# Patient Record
Sex: MALE | Race: WHITE | NOT HISPANIC OR LATINO | Employment: FULL TIME | URBAN - METROPOLITAN AREA
[De-identification: names, ages, dates, MRNs, and addresses within clinical notes are randomized per-mention and may not be internally consistent; named-entity substitution may affect disease eponyms.]

---

## 2017-03-27 ENCOUNTER — COMMUNICATION - HEALTHEAST (OUTPATIENT)
Dept: FAMILY MEDICINE | Facility: CLINIC | Age: 37
End: 2017-03-27

## 2017-03-27 DIAGNOSIS — F43.22 ADJUSTMENT DISORDER WITH ANXIOUS MOOD: ICD-10-CM

## 2017-07-01 ENCOUNTER — COMMUNICATION - HEALTHEAST (OUTPATIENT)
Dept: FAMILY MEDICINE | Facility: CLINIC | Age: 37
End: 2017-07-01

## 2017-07-01 DIAGNOSIS — F43.22 ADJUSTMENT DISORDER WITH ANXIOUS MOOD: ICD-10-CM

## 2017-10-10 ENCOUNTER — COMMUNICATION - HEALTHEAST (OUTPATIENT)
Dept: FAMILY MEDICINE | Facility: CLINIC | Age: 37
End: 2017-10-10

## 2017-10-10 DIAGNOSIS — F43.22 ADJUSTMENT DISORDER WITH ANXIOUS MOOD: ICD-10-CM

## 2017-11-01 ENCOUNTER — OFFICE VISIT - HEALTHEAST (OUTPATIENT)
Dept: FAMILY MEDICINE | Facility: CLINIC | Age: 37
End: 2017-11-01

## 2017-11-01 DIAGNOSIS — Z13.29 SCREENING FOR ENDOCRINE DISORDER: ICD-10-CM

## 2017-11-01 DIAGNOSIS — F43.22 ADJUSTMENT DISORDER WITH ANXIOUS MOOD: ICD-10-CM

## 2017-11-01 DIAGNOSIS — Z13.29 SCREENING FOR THYROID DISORDER: ICD-10-CM

## 2017-11-01 DIAGNOSIS — Z13.220 SCREENING, LIPID: ICD-10-CM

## 2017-11-01 DIAGNOSIS — Z00.00 PREVENTATIVE HEALTH CARE: ICD-10-CM

## 2017-11-01 DIAGNOSIS — R06.83 SNORING: ICD-10-CM

## 2017-11-01 DIAGNOSIS — Z13.0 SCREENING, ANEMIA, DEFICIENCY, IRON: ICD-10-CM

## 2017-11-01 DIAGNOSIS — R63.5 WEIGHT GAIN: ICD-10-CM

## 2017-11-01 DIAGNOSIS — E55.9 VITAMIN D DEFICIENCY: ICD-10-CM

## 2017-11-01 DIAGNOSIS — Z13.228 ENCOUNTER FOR SCREENING FOR METABOLIC DISORDER: ICD-10-CM

## 2017-11-01 LAB
CHOLEST SERPL-MCNC: 190 MG/DL
FASTING STATUS PATIENT QL REPORTED: YES
HDLC SERPL-MCNC: 37 MG/DL
LDLC SERPL CALC-MCNC: 135 MG/DL
TRIGL SERPL-MCNC: 92 MG/DL

## 2017-11-01 RX ORDER — BUPROPION HYDROCHLORIDE 150 MG/1
150 TABLET ORAL EVERY MORNING
Qty: 90 TABLET | Refills: 3 | Status: SHIPPED | OUTPATIENT
Start: 2017-11-01 | End: 2022-12-27

## 2017-11-01 ASSESSMENT — MIFFLIN-ST. JEOR: SCORE: 1947.61

## 2017-11-01 NOTE — ASSESSMENT & PLAN NOTE
Zack Ward is a 37 y.o. male says he is doing well with the celexa and wants to continue that.  He has noticed weight gain and feeling tired (he takes the celexa in the mornings).     I mentioned augmenting with Wellbutrin to help with weight gain and energy.  I suggested taking Celexa in the evening and the Wellbutrin in the morning.  He wants to try this, he has no family or personal history of seizures.    Continue Celexa 20 mg orally nightly  Start Wellbutrin 150 mg XR every morning.    Follow up with problems or concerns.

## 2017-11-01 NOTE — ASSESSMENT & PLAN NOTE
He tells me that his wife has noticed him snoring lately.  We did look at his weight graft and he has been gaining weight.  I suggested he attempted weight reduction.    I did ask if he is gasping for breath or if he stops breathing at night and he said his wife assures him that he did not do that.  No sleep study will be ordered at this time, we will watch and wait.

## 2017-11-01 NOTE — ASSESSMENT & PLAN NOTE
Vitamin d level is low. It is 29.1 but I would like to see it closer to 60.  I recommend you start taking (or increase current intake of) over the counter vitamin D3, 2000 IU daily.  We can recheck your vitamin D level in 3 months or at your next visit.

## 2018-09-21 ENCOUNTER — RECORDS - HEALTHEAST (OUTPATIENT)
Dept: ADMINISTRATIVE | Facility: OTHER | Age: 38
End: 2018-09-21

## 2018-12-21 ENCOUNTER — COMMUNICATION - HEALTHEAST (OUTPATIENT)
Dept: FAMILY MEDICINE | Facility: CLINIC | Age: 38
End: 2018-12-21

## 2018-12-21 DIAGNOSIS — F43.22 ADJUSTMENT DISORDER WITH ANXIOUS MOOD: ICD-10-CM

## 2018-12-24 RX ORDER — CITALOPRAM HYDROBROMIDE 20 MG/1
20 TABLET ORAL AT BEDTIME
Qty: 90 TABLET | Refills: 3 | Status: SHIPPED | OUTPATIENT
Start: 2018-12-24 | End: 2022-12-27

## 2019-03-15 ENCOUNTER — RECORDS - HEALTHEAST (OUTPATIENT)
Dept: ADMINISTRATIVE | Facility: OTHER | Age: 39
End: 2019-03-15

## 2019-10-04 ENCOUNTER — RECORDS - HEALTHEAST (OUTPATIENT)
Dept: ADMINISTRATIVE | Facility: OTHER | Age: 39
End: 2019-10-04

## 2020-02-19 ENCOUNTER — COMMUNICATION - HEALTHEAST (OUTPATIENT)
Dept: FAMILY MEDICINE | Facility: CLINIC | Age: 40
End: 2020-02-19

## 2020-02-19 DIAGNOSIS — F43.22 ADJUSTMENT DISORDER WITH ANXIOUS MOOD: ICD-10-CM

## 2021-05-31 ENCOUNTER — RECORDS - HEALTHEAST (OUTPATIENT)
Dept: ADMINISTRATIVE | Facility: CLINIC | Age: 41
End: 2021-05-31

## 2021-05-31 VITALS — HEIGHT: 71 IN | BODY MASS INDEX: 31.22 KG/M2 | WEIGHT: 223 LBS

## 2021-06-04 ENCOUNTER — RECORDS - HEALTHEAST (OUTPATIENT)
Dept: ADMINISTRATIVE | Facility: OTHER | Age: 41
End: 2021-06-04

## 2021-06-06 NOTE — TELEPHONE ENCOUNTER
RN cannot approve Refill Request    RN can NOT refill this medication Protocol failed and NO refill given.      Nery Jaramillo, [facility] Triage/Med Refill 2/21/2020    Requested Prescriptions   Pending Prescriptions Disp Refills     citalopram (CELEXA) 20 MG tablet [Pharmacy Med Name: CITALOPRAM HBR 20 MG TABLET] 90 tablet 3     Sig: TAKE 1 TABLET BY MOUTH EVERYDAY AT BEDTIME       [facility] Refill Protocol  Failed - 2/19/2020  3:45 AM        Failed - PCP or prescribing provider visit in last year     Last office visit with prescriber/PCP: Visit date not found OR same dept: Visit date not found OR same specialty: 11/1/2017 Valerie Stearns MD  Last physical: Visit date not found Last MTM visit: Visit date not found   Next visit within 3 mo: Visit date not found  Next physical within 3 mo: Visit date not found  Prescriber OR PCP: Getachew Mayorga DO  Last diagnosis associated with med order: 1. Adjustment disorder with anxious mood  - citalopram (CELEXA) 20 MG tablet [Pharmacy Med Name: CITALOPRAM HBR 20 MG TABLET]; TAKE 1 TABLET BY MOUTH EVERYDAY AT BEDTIME  Dispense: 90 tablet; Refill: 3    If protocol passes may refill for 12 months if within 3 months of last provider visit (or a total of 15 months).

## 2021-06-06 NOTE — TELEPHONE ENCOUNTER
I have not seen him for this or any other problem in 2 years (11/2017) please find out who is prescribing - I suspect he has new pcp. Or he will need an appointment.

## 2021-06-13 NOTE — PROGRESS NOTES
ASSESSMENT AND PLAN:  Problem List Items Addressed This Visit     Adjustment disorder     Zack Ward is a 37 y.o. male says he is doing well with the celexa and wants to continue that.  He has noticed weight gain and feeling tired (he takes the celexa in the mornings).     I mentioned augmenting with Wellbutrin to help with weight gain and energy.  I suggested taking Celexa in the evening and the Wellbutrin in the morning.  He wants to try this, he has no family or personal history of seizures.    Continue Celexa 20 mg orally nightly  Start Wellbutrin 150 mg XR every morning.    Follow up with problems or concerns.          Relevant Medications    buPROPion (WELLBUTRIN XL) 150 MG 24 hr tablet    citalopram (CELEXA) 20 MG tablet    Snoring     He tells me that his wife has noticed him snoring lately.  We did look at his weight graft and he has been gaining weight.  I suggested he attempted weight reduction.    I did ask if he is gasping for breath or if he stops breathing at night and he said his wife assures him that he did not do that.  No sleep study will be ordered at this time, we will watch and wait.         Preventative health care     He is due for fasting lipids, fasting glucose and he is fasting today so I did order some blood work.         BMI 30.0-30.9,adult     Weight reduction is strongly recommended.           Other Visit Diagnoses     Weight gain    -  Primary    Relevant Orders    Thyroid Cascade    Vitamin D, Total (25-Hydroxy)    Encounter for screening for metabolic disorder        Relevant Orders    Comprehensive Metabolic Panel    Screening, anemia, deficiency, iron        Relevant Orders    HM1(CBC and Differential)    HM1 (CBC with Diff)    Screening, lipid        Relevant Orders    Lipid Cascade    Screening for thyroid disorder        Screening for endocrine disorder             Chief Complaint   Patient presents with     Medication Management     Patient is fasting for any labs.   "    HPI  Zack Ward is a 37 y.o. male comes in for a refill of his Celexa today.  He says things are going well.  He also mentions that he started snoring and wonders if anything needs to be done about that.  He says his wife assures him he does not gasp for breath or stop breathing at night.    History   Smoking Status     Never Smoker   Smokeless Tobacco     Never Used      Current Outpatient Prescriptions   Medication Sig Dispense Refill     albuterol (PROVENTIL HFA;VENTOLIN HFA) 90 mcg/actuation inhaler Inhale 2 puffs every 6 (six) hours as needed for wheezing. 1 Inhaler 0     citalopram (CELEXA) 20 MG tablet Take 1 tablet (20 mg total) by mouth at bedtime. 90 tablet 3     buPROPion (WELLBUTRIN XL) 150 MG 24 hr tablet Take 1 tablet (150 mg total) by mouth every morning. 90 tablet 3     No current facility-administered medications for this visit.      No Known Allergies  Review of Systems   Constitutional: Negative.    HENT: Negative.    Eyes: Negative.    Respiratory: Negative.    Cardiovascular: Negative.    Gastrointestinal: Negative.    Endocrine: Negative.    Genitourinary: Negative.    Musculoskeletal: Negative.    Skin: Negative.    Neurological: Negative.    Hematological: Negative.    Psychiatric/Behavioral: Negative.       OBJECTIVE: Blood pressure 102/60, pulse 80, resp. rate 16, height 5' 11.25\" (1.81 m), weight (!) 223 lb (101.2 kg).  Physical Exam   Constitutional: He is oriented to person, place, and time. He appears well-developed and well-nourished. No distress.   HENT:   Head: Normocephalic and atraumatic.   Eyes: Conjunctivae are normal.   Neck: Neck supple.   Cardiovascular: Normal rate and regular rhythm.    Pulmonary/Chest: Effort normal.   Musculoskeletal: Normal range of motion.   Neurological: He is alert and oriented to person, place, and time.   Skin: Skin is warm and dry.   Psychiatric: He has a normal mood and affect.      Little interest or pleasure in doing things: Not at " all  Feeling down, depressed, or hopeless: Not at all  Trouble falling or staying asleep, or sleeping too much: Several days  Feeling tired or having little energy: Several days  Poor appetite or overeating: Several days  Feeling bad about yourself - or that you are a failure or have let yourself or your family down: Not at all  Trouble concentrating on things, such as reading the newspaper or watching television: Not at all  Moving or speaking so slowly that other people could have noticed. Or the opposite - being so fidgety or restless that you have been moving around a lot more than usual: Not at all  Thoughts that you would be better off dead, or of hurting yourself in some way: Not at all  PHQ-9 Total Score: 3

## 2021-06-16 PROBLEM — R06.83 SNORING: Status: ACTIVE | Noted: 2017-11-01

## 2021-06-16 PROBLEM — E55.9 VITAMIN D DEFICIENCY: Status: ACTIVE | Noted: 2017-11-07

## 2021-06-30 ENCOUNTER — OFFICE VISIT - HEALTHEAST (OUTPATIENT)
Dept: FAMILY MEDICINE | Facility: CLINIC | Age: 41
End: 2021-06-30

## 2021-06-30 DIAGNOSIS — Z13.228 ENCOUNTER FOR SCREENING FOR METABOLIC DISORDER: ICD-10-CM

## 2021-06-30 DIAGNOSIS — Z13.220 LIPID SCREENING: ICD-10-CM

## 2021-06-30 DIAGNOSIS — Z00.00 PREVENTATIVE HEALTH CARE: ICD-10-CM

## 2021-06-30 DIAGNOSIS — F43.22 ADJUSTMENT DISORDER WITH ANXIOUS MOOD: ICD-10-CM

## 2021-06-30 DIAGNOSIS — E55.9 VITAMIN D DEFICIENCY: ICD-10-CM

## 2021-06-30 DIAGNOSIS — F41.9 ANXIETY: ICD-10-CM

## 2021-06-30 DIAGNOSIS — Z13.0 SCREENING, ANEMIA, DEFICIENCY, IRON: ICD-10-CM

## 2021-06-30 LAB
ALBUMIN SERPL-MCNC: 4.1 G/DL (ref 3.5–5)
ALP SERPL-CCNC: 102 U/L (ref 45–120)
ALT SERPL W P-5'-P-CCNC: 68 U/L (ref 0–45)
ANION GAP SERPL CALCULATED.3IONS-SCNC: 10 MMOL/L (ref 5–18)
AST SERPL W P-5'-P-CCNC: 30 U/L (ref 0–40)
BASOPHILS # BLD AUTO: 0 THOU/UL (ref 0–0.2)
BASOPHILS NFR BLD AUTO: 0 % (ref 0–2)
BILIRUB SERPL-MCNC: 0.5 MG/DL (ref 0–1)
BUN SERPL-MCNC: 17 MG/DL (ref 8–22)
CALCIUM SERPL-MCNC: 8.9 MG/DL (ref 8.5–10.5)
CHLORIDE BLD-SCNC: 103 MMOL/L (ref 98–107)
CHOLEST SERPL-MCNC: 202 MG/DL
CO2 SERPL-SCNC: 27 MMOL/L (ref 22–31)
CREAT SERPL-MCNC: 0.92 MG/DL (ref 0.7–1.3)
EOSINOPHIL # BLD AUTO: 0.1 THOU/UL (ref 0–0.4)
EOSINOPHIL NFR BLD AUTO: 2 % (ref 0–6)
ERYTHROCYTE [DISTWIDTH] IN BLOOD BY AUTOMATED COUNT: 12.7 % (ref 11–14.5)
FASTING STATUS PATIENT QL REPORTED: YES
GFR SERPL CREATININE-BSD FRML MDRD: >60 ML/MIN/1.73M2
GLUCOSE BLD-MCNC: 102 MG/DL (ref 70–125)
HCT VFR BLD AUTO: 43.9 % (ref 40–54)
HDLC SERPL-MCNC: 50 MG/DL
HGB BLD-MCNC: 15.5 G/DL (ref 14–18)
IMM GRANULOCYTES # BLD: 0 THOU/UL
IMM GRANULOCYTES NFR BLD: 0 %
LDLC SERPL CALC-MCNC: 133 MG/DL
LYMPHOCYTES # BLD AUTO: 1.2 THOU/UL (ref 0.8–4.4)
LYMPHOCYTES NFR BLD AUTO: 22 % (ref 20–40)
MCH RBC QN AUTO: 29.1 PG (ref 27–34)
MCHC RBC AUTO-ENTMCNC: 35.3 G/DL (ref 32–36)
MCV RBC AUTO: 82 FL (ref 80–100)
MONOCYTES # BLD AUTO: 0.4 THOU/UL (ref 0–0.9)
MONOCYTES NFR BLD AUTO: 7 % (ref 2–10)
NEUTROPHILS # BLD AUTO: 3.7 THOU/UL (ref 2–7.7)
NEUTROPHILS NFR BLD AUTO: 69 % (ref 50–70)
PLATELET # BLD AUTO: 184 THOU/UL (ref 140–440)
PMV BLD AUTO: 8 FL (ref 7–10)
POTASSIUM BLD-SCNC: 4.5 MMOL/L (ref 3.5–5)
PROT SERPL-MCNC: 6.9 G/DL (ref 6–8)
RBC # BLD AUTO: 5.33 MILL/UL (ref 4.4–6.2)
SODIUM SERPL-SCNC: 140 MMOL/L (ref 136–145)
TRIGL SERPL-MCNC: 94 MG/DL
WBC: 5.5 THOU/UL (ref 4–11)

## 2021-06-30 ASSESSMENT — MIFFLIN-ST. JEOR: SCORE: 1862

## 2021-06-30 NOTE — ASSESSMENT & PLAN NOTE
Controlled with celexa 20 mg po q day for years.  However he wants to try increased dose, see [employer] message 7/2/2021 -     Discontinue celexa 20 mg po q day  Start celexa 40 mg po q day.   phq 2 - 0

## 2021-06-30 NOTE — ASSESSMENT & PLAN NOTE
Controlled with celexa 20 mg po q day for years.  However he wants to try increased dose, see [employer] message 7/2/2021 -     Discontinue celexa 20 mg po q day  Start celexa 40 mg po q day.   phq 2 - 0

## 2021-06-30 NOTE — ASSESSMENT & PLAN NOTE
Controlled with celexa 20 mg po q day for years.  However he wants to try increased dose, see Skype message 7/2/2021 -     Discontinue celexa 20 mg po q day  Start celexa 40 mg po q day.   phq 2 - 0

## 2021-06-30 NOTE — ASSESSMENT & PLAN NOTE
Controlled with celexa 20 mg po q day for years.  However he wants to try increased dose, see [employer] message 7/2/2021 -     Discontinue celexa 20 mg po q day  Start celexa 40 mg po q day.   phq 2 - 0

## 2021-06-30 NOTE — ASSESSMENT & PLAN NOTE
Controlled with celexa 20 mg po q day for years.  However he wants to try increased dose, see V[employer] message 7/2/2021 -     Discontinue celexa 20 mg po q day  Start celexa 40 mg po q day.   phq 2 - 0

## 2021-06-30 NOTE — ASSESSMENT & PLAN NOTE
Controlled with celexa 20 mg po q day for years.  However he wants to try increased dose, see [employer] message 7/2/2021 -     Discontinue celexa 20 mg po q day  Start celexa 40 mg po q day.   phq 2 - 0

## 2021-06-30 NOTE — ASSESSMENT & PLAN NOTE
Controlled with celexa 20 mg po q day for years.  However he wants to try increased dose, see [employer] message 7/2/2021 -     Discontinue celexa 20 mg po q day  Start celexa 40 mg po q day.   phq 2 - 0

## 2021-06-30 NOTE — ASSESSMENT & PLAN NOTE
Controlled with celexa 20 mg po q day for years.  However he wants to try increased dose, see [employer] message 7/2/2021 -     Discontinue celexa 20 mg po q day  Start celexa 40 mg po q day.   phq 2 - 0

## 2021-06-30 NOTE — ASSESSMENT & PLAN NOTE
Controlled with celexa 20 mg po q day for years.  However he wants to try increased dose, see [employer] message 7/2/2021 -     Discontinue celexa 20 mg po q day  Start celexa 40 mg po q day.   phq 2 - 0

## 2021-06-30 NOTE — ASSESSMENT & PLAN NOTE
Controlled with celexa 20 mg po q day for years.  However he wants to try increased dose, see [employer] message 7/2/2021 -     Discontinue celexa 20 mg po q day  Start celexa 40 mg po q day.   phq 2 - 0

## 2021-06-30 NOTE — ASSESSMENT & PLAN NOTE
Controlled with celexa 20 mg po q day for years.  However he wants to try increased dose, see [employer] message 7/2/2021 -     Discontinue celexa 20 mg po q day  Start celexa 40 mg po q day.   phq 2 - 0

## 2021-06-30 NOTE — ASSESSMENT & PLAN NOTE
Controlled with celexa 20 mg po q day for years.  However he wants to try increased dose, see [employer] message 7/2/2021 -     Discontinue celexa 20 mg po q day  Start celexa 40 mg po q day.   phq 2 - 0

## 2021-06-30 NOTE — ASSESSMENT & PLAN NOTE
Controlled with celexa 20 mg po q day for years.  However he wants to try increased dose, see Jukely message 7/2/2021 -     Discontinue celexa 20 mg po q day  Start celexa 40 mg po q day.   phq 2 - 0

## 2021-06-30 NOTE — ASSESSMENT & PLAN NOTE
Controlled with celexa 20 mg po q day for years.  However he wants to try increased dose, see [employer] message 7/2/2021 -     Discontinue celexa 20 mg po q day  Start celexa 40 mg po q day.   phq 2 - 0

## 2021-06-30 NOTE — ASSESSMENT & PLAN NOTE
Covid vaccine - done.   Flu shot recommended in the fall.   Colonoscopy:  There is no family or personal history, not indicated    Std testing desired: declined.  offered  Osteoporosis prevention discussed.  vitamin d levels ordered. Recommend daily calcium and vitamin d intake to keep good bone health. Recommend weight bearing exercise, no tobacco, and limit alcohol  dexa - no indication.   Recommend sunscreen, exercise, & healthy diet.  Offered cbc, cmp, lipids and asked what other testing he  desires today  I have had an Advance Directives discussion with the patient.   Body mass index is 28.59 kg/m .   cameronhart active.

## 2021-06-30 NOTE — ASSESSMENT & PLAN NOTE
Controlled with celexa 20 mg po q day for years.  However he wants to try increased dose, see [employer] message 7/2/2021 -     Discontinue celexa 20 mg po q day  Start celexa 40 mg po q day.   phq 2 - 0

## 2021-06-30 NOTE — ASSESSMENT & PLAN NOTE
Controlled with celexa 20 mg po q day for years.  However he wants to try increased dose, see [employer] message 7/2/2021 -     Discontinue celexa 20 mg po q day  Start celexa 40 mg po q day.   phq 2 - 0

## 2021-06-30 NOTE — ASSESSMENT & PLAN NOTE
Controlled with celexa 20 mg po q day for years.  However he wants to try increased dose, see Myla message 7/2/2021 -     Discontinue celexa 20 mg po q day  Start celexa 40 mg po q day.   phq 2 - 0

## 2021-06-30 NOTE — ASSESSMENT & PLAN NOTE
Controlled with celexa 20 mg po q day for years.  However he wants to try increased dose, see [employer] message 7/2/2021 -     Discontinue celexa 20 mg po q day  Start celexa 40 mg po q day.   phq 2 - 0

## 2021-06-30 NOTE — ASSESSMENT & PLAN NOTE
Controlled with celexa 20 mg po q day for years.  However he wants to try increased dose, see [employer] message 7/2/2021 -     Discontinue celexa 20 mg po q day  Start celexa 40 mg po q day.   phq 2 - 0

## 2021-06-30 NOTE — ASSESSMENT & PLAN NOTE
Controlled with celexa 20 mg po q day for years.  However he wants to try increased dose, see [employer] message 7/2/2021 -     Discontinue celexa 20 mg po q day  Start celexa 40 mg po q day.   phq 2 - 0

## 2021-06-30 NOTE — ASSESSMENT & PLAN NOTE
Controlled with celexa 20 mg po q day for years.  However he wants to try increased dose, see [employer] message 7/2/2021 -     Discontinue celexa 20 mg po q day  Start celexa 40 mg po q day.   phq 2 - 0

## 2021-06-30 NOTE — ASSESSMENT & PLAN NOTE
Controlled with celexa 20 mg po q day for years.  However he wants to try increased dose, see [employer] message 7/2/2021 -     Discontinue celexa 20 mg po q day  Start celexa 40 mg po q day.   phq 2 - 0

## 2021-06-30 NOTE — ASSESSMENT & PLAN NOTE
Controlled with celexa 20 mg po q day for years.  However he wants to try increased dose, see [employer] message 7/2/2021 -     Discontinue celexa 20 mg po q day  Start celexa 40 mg po q day.   phq 2 - 0

## 2021-06-30 NOTE — ASSESSMENT & PLAN NOTE
Controlled with celexa 20 mg po q day for years.  However he wants to try increased dose, see [employer] message 7/2/2021 -     Discontinue celexa 20 mg po q day  Start celexa 40 mg po q day.   phq 2 - 0

## 2021-06-30 NOTE — ASSESSMENT & PLAN NOTE
Controlled with celexa 20 mg po q day for years.  However he wants to try increased dose, see [employer] message 7/2/2021 -     Discontinue celexa 20 mg po q day  Start celexa 40 mg po q day.   phq 2 - 0

## 2021-06-30 NOTE — ASSESSMENT & PLAN NOTE
Controlled with celexa 20 mg po q day for years.  However he wants to try increased dose, see [employer] message 7/2/2021 -     Discontinue celexa 20 mg po q day  Start celexa 40 mg po q day.   phq 2 - 0

## 2021-06-30 NOTE — ASSESSMENT & PLAN NOTE
Controlled with celexa 20 mg po q day for years.  However he wants to try increased dose, see [employer] message 7/2/2021 -     Discontinue celexa 20 mg po q day  Start celexa 40 mg po q day.   phq 2 - 0

## 2021-06-30 NOTE — ASSESSMENT & PLAN NOTE
Controlled with celexa 20 mg po q day for years.  However he wants to try increased dose, see [employer] message 7/2/2021 -     Discontinue celexa 20 mg po q day  Start celexa 40 mg po q day.   phq 2 - 0

## 2021-06-30 NOTE — ASSESSMENT & PLAN NOTE
Controlled with celexa 20 mg po q day for years.  However he wants to try increased dose, see [employer] message 7/2/2021 -     Discontinue celexa 20 mg po q day  Start celexa 40 mg po q day.   phq 2 - 0

## 2021-06-30 NOTE — ASSESSMENT & PLAN NOTE
Controlled with celexa 20 mg po q day for years.  However he wants to try increased dose, see [employer] message 7/2/2021 -     Discontinue celexa 20 mg po q day  Start celexa 40 mg po q day.   phq 2 - 0

## 2021-06-30 NOTE — ASSESSMENT & PLAN NOTE
Controlled with celexa 20 mg po q day for years.  However he wants to try increased dose, see [employer] message 7/2/2021 -     Discontinue celexa 20 mg po q day  Start celexa 40 mg po q day.   phq 2 - 0

## 2021-06-30 NOTE — ASSESSMENT & PLAN NOTE
Controlled with celexa 20 mg po q day for years.  However he wants to try increased dose, see [employer] message 7/2/2021 -     Discontinue celexa 20 mg po q day  Start celexa 40 mg po q day.   phq 2 - 0

## 2021-06-30 NOTE — ASSESSMENT & PLAN NOTE
Controlled with celexa 20 mg po q day for years.  However he wants to try increased dose, see [employer]book[employer] message 7/2/2021 -     Discontinue celexa 20 mg po q day  Start celexa 40 mg po q day.   phq 2 - 0

## 2021-06-30 NOTE — ASSESSMENT & PLAN NOTE
Controlled with celexa 20 mg po q day for years.  However he wants to try increased dose, see [employer] message 7/2/2021 -     Discontinue celexa 20 mg po q day  Start celexa 40 mg po q day.   phq 2 - 0

## 2021-06-30 NOTE — ASSESSMENT & PLAN NOTE
Controlled with celexa 20 mg po q day for years.  However he wants to try increased dose, see [employer] message 7/2/2021 -     Discontinue celexa 20 mg po q day  Start celexa 40 mg po q day.   phq 2 - 0

## 2021-07-01 ENCOUNTER — COMMUNICATION - HEALTHEAST (OUTPATIENT)
Dept: ADMINISTRATIVE | Facility: CLINIC | Age: 41
End: 2021-07-01

## 2021-07-01 LAB — 25(OH)D3 SERPL-MCNC: 43.9 NG/ML (ref 30–80)

## 2021-07-02 RX ORDER — CITALOPRAM HYDROBROMIDE 40 MG/1
40 TABLET ORAL DAILY
Qty: 90 TABLET | Refills: 3 | Status: SHIPPED | OUTPATIENT
Start: 2021-07-02 | End: 2022-11-07

## 2021-07-04 NOTE — TELEPHONE ENCOUNTER
Telephone Encounter by Valerie Stearns MD at 7/2/2021 12:36 PM     Author: Valerie Stearns MD Service: -- Author Type: Physician    Filed: 7/2/2021 12:37 PM Encounter Date: 7/1/2021 Status: Signed    : Valerie Stearns MD (Physician)       Ofcourse, I have addended his last visit to reflect this change.

## 2021-07-04 NOTE — PROGRESS NOTES
Progress Notes by Valerie Stearns MD at 6/30/2021  7:40 AM     Author: Valerie Stearns MD Service: -- Author Type: Physician    Filed: 7/2/2021 12:36 PM Encounter Date: 6/30/2021 Status: Addendum    : Valerie Stearns MD (Physician)    Related Notes: Original Note by Valerie Stearns MD (Physician) filed at 6/30/2021  8:05 AM       MALE PREVENTATIVE EXAM  celexa dose adjusted addressed above and beyond usual scope of annual exam.     Assessment and Plan:   Patient has been advised of split billing requirements and indicates understanding: Yes    Problem List Items Addressed This Visit        Unprioritized    Anxiety     Controlled with celexa 20 mg po q day for years.  However he wants to try increased dose, see [employer] message 7/2/2021 -     Discontinue celexa 20 mg po q day  Start celexa 40 mg po q day.   phq 2 - 0           Relevant Medications    citalopram (CELEXA) 40 MG tablet    Preventative health care     Covid vaccine - done.   Flu shot recommended in the fall.   Colonoscopy:  There is no family or personal history, not indicated    Std testing desired: declined.  offered  Osteoporosis prevention discussed.  vitamin d levels ordered. Recommend daily calcium and vitamin d intake to keep good bone health. Recommend weight bearing exercise, no tobacco, and limit alcohol  dexa - no indication.   Recommend sunscreen, exercise, & healthy diet.  Offered cbc, cmp, lipids and asked what other testing he  desires today  I have had an Advance Directives discussion with the patient.   Body mass index is 28.59 kg/m .   mychart active.          BMI 28.0-28.9,adult     Improved with peleton         Vitamin D deficiency     Recommended recheck.          Relevant Orders    Vitamin D, Total (25-Hydroxy) (Completed)      Other Visit Diagnoses     Lipid screening    -  Primary    Relevant Orders    Lipid [place] FASTING (Completed)    Encounter for screening for metabolic disorder        Relevant Orders     Comprehensive Metabolic Panel (Completed)    Screening, anemia, deficiency, iron        Relevant Orders    HM1(CBC and Differential) (Completed)            Next follow up:  No follow-ups on file.    Immunization Review  Adult Imm Review: No immunizations due today  Social History     Tobacco Use   Smoking Status Never Smoker   Smokeless Tobacco Never Used      I discussed the following with the patient:   Adult Healthy Living: Importance of regular exercise    I have had an Advance Directives discussion with the patient.    Subjective:   Chief Complaint: Zack Ward is an 41 y.o. male here for a preventative health visit.    Patient has been advised of split billing requirements and indicates understanding: Yes  HPI:  Comes in to reestablish care after being seen elsewhere since 2017.    Healthy Habits  Are you taking a daily aspirin? No  Do you typically exercising at least 40 min, 3-4 times per week?  Yes  Do you usually eat at least 4 servings of fruit and vegetables a day, include whole grains and fiber and avoid regularly eating high fat foods? Yes  Have you had an eye exam in the past two years? Yes  Do you see a dentist twice per year? Yes  Do you have any concerns regarding sleep? No    Safety Screen  If you own firearms, are they secured in a locked gun cabinet or with trigger locks? The patient does not own any firearms  Do you feel you are safe where you are living?: Yes (6/30/2021  7:41 AM)  Do you feel you are safe in your relationship(s)?: Yes (6/30/2021  7:41 AM)      Review of Systems:  Please see above.  The rest of the review of systems are negative for all systems.     Cancer Screening     Patient has no health maintenance due at this time          Patient Care Team:  Valerie Stearns MD as PCP - General (Family Medicine)        History     Reviewed By Date/Time Sections Reviewed    Valerie Stearns MD 6/30/2021  7:47 AM Family    Valerie Stearns MD 6/30/2021  7:46 AM Medical,  "Surgical    Valerie Stearns MD 6/30/2021  7:44 AM Medical, Surgical, Family, Social Documentation    Harry Cortney ANANYA MEANS 6/30/2021  7:41 AM Tobacco            Objective:   Vital Signs:   Visit Vitals  /82   Pulse 76   Resp 12   Ht 5' 11\" (1.803 m)   Wt 205 lb (93 kg)   BMI 28.59 kg/m           PHYSICAL EXAM  Physical Exam  Constitutional:       General: He is not in acute distress.     Appearance: He is well-developed.   HENT:      Head: Normocephalic and atraumatic.      Right Ear: Tympanic membrane, ear canal and external ear normal.      Left Ear: Tympanic membrane, ear canal and external ear normal.      Mouth/Throat:      Mouth: Mucous membranes are moist.      Pharynx: Oropharynx is clear.   Eyes:      Extraocular Movements: Extraocular movements intact.      Conjunctiva/sclera: Conjunctivae normal.   Neck:      Musculoskeletal: Neck supple.   Cardiovascular:      Rate and Rhythm: Normal rate and regular rhythm.      Heart sounds: Normal heart sounds.   Pulmonary:      Effort: Pulmonary effort is normal.      Breath sounds: Normal breath sounds.   Abdominal:      General: Bowel sounds are normal.      Palpations: Abdomen is soft.   Musculoskeletal: Normal range of motion.   Skin:     General: Skin is warm and dry.   Neurological:      Mental Status: He is alert and oriented to person, place, and time.   Psychiatric:         Mood and Affect: Mood normal.         Behavior: Behavior normal.         Thought Content: Thought content normal.         Judgment: Judgment normal.          The 10-year ASCVD risk score (Wilfred THONG Meyer., et al., 2013) is: 1.1%    Values used to calculate the score:      Age: 41 years      Sex: Male      Is Non- : No      Diabetic: No      Tobacco smoker: No      Systolic Blood Pressure: 124 mmHg      Is BP treated: No      HDL Cholesterol: 50 mg/dL      Total Cholesterol: 202 mg/dL         Medication List          Accurate as of June 30, 2021 11:59 PM. If " you have any questions, ask your nurse or doctor.            CHANGE how you take these medications    citalopram 40 MG tablet  Also known as: celeXA  INSTRUCTIONS: Take 1 tablet (40 mg total) by mouth daily.  What changed:     medication strength    how much to take    when to take this  Changed by: Valerie Stearns MD           STOP taking these medications    albuterol 90 mcg/actuation inhaler  Also known as: PROAIR HFA;PROVENTIL HFA;VENTOLIN HFA  Stopped by: Valerie Stearns MD     buPROPion 150 MG 24 hr tablet  Also known as: Wellbutrin XL  Stopped by: Valerie Stearns MD           Where to Get Your Medications      These medications were sent to [place] [address] [place], [place] - [address] [facility]  [address] [facility] [address]    Phone: [phone]     citalopram 40 MG tablet         Additional Screenings Completed Today:

## 2021-07-04 NOTE — TELEPHONE ENCOUNTER
Telephone Encounter by Luh Golden CMA at 7/2/2021  2:07 PM     Author: Luh Golden CMA Service: -- Author Type: Medical Assistant    Filed: 7/2/2021  2:07 PM Encounter Date: 7/1/2021 Status: Signed    : Luh Golden CMA (Medical Assistant)       Message left for mary lou that rx was sent to pharm with dose change

## 2021-07-04 NOTE — ADDENDUM NOTE
Addendum Note by Yenifer Stearns MD at 6/30/2021  7:40 AM     Author: Yenifer Stearns MD Service: -- Author Type: Physician    Filed: 7/2/2021 12:36 PM Encounter Date: 6/30/2021 Status: Signed    : Yenifer Stearns MD (Physician)    Addended by: YENIFER STEARNS on: 7/2/2021 12:36 PM        Modules accepted: Orders

## 2021-07-04 NOTE — TELEPHONE ENCOUNTER
Telephone Encounter by Alissa Whitaker at 7/1/2021  2:19 PM     Author: Alissa Whitaker Service: -- Author Type: Patient Access    Filed: 7/1/2021  2:23 PM Encounter Date: 7/1/2021 Status: Signed    : Alissa Whitaker (Patient Access)       Reason for Call:  Other call back      Detailed comments: Patient was just in on 6/30/21. Pt is inquiring if he can move up to the next dosage of citalopram (CELEXA). He is currently on 20 mg and has not picked up the current RX from [facility].   He has been under increase amount of stress and pressure with increase irritability. This is due to the work load and location due to the pandemic.     If ok to increase to next dosage, please send to [facility] in [place], [place].    Phone Number Patient can be reached at:   Cell number on file:    Telephone Information:   Mobile [phone]       Best Time:     Can we leave a detailed message on this number?: Yes    Call taken on 7/1/2021 at 2:19 PM by Alissa Whitaker

## 2021-07-05 PROBLEM — R06.83 SNORING: Status: RESOLVED | Noted: 2017-11-01 | Resolved: 2021-06-30

## 2021-07-06 VITALS
RESPIRATION RATE: 12 BRPM | SYSTOLIC BLOOD PRESSURE: 124 MMHG | BODY MASS INDEX: 28.7 KG/M2 | HEART RATE: 76 BPM | DIASTOLIC BLOOD PRESSURE: 82 MMHG | WEIGHT: 205 LBS | HEIGHT: 71 IN

## 2021-10-16 ENCOUNTER — HEALTH MAINTENANCE LETTER (OUTPATIENT)
Age: 41
End: 2021-10-16

## 2022-02-08 ENCOUNTER — TRANSFERRED RECORDS (OUTPATIENT)
Dept: HEALTH INFORMATION MANAGEMENT | Facility: CLINIC | Age: 42
End: 2022-02-08

## 2022-03-03 ENCOUNTER — TRANSFERRED RECORDS (OUTPATIENT)
Dept: HEALTH INFORMATION MANAGEMENT | Facility: CLINIC | Age: 42
End: 2022-03-03

## 2022-09-14 DIAGNOSIS — F43.22 ADJUSTMENT DISORDER WITH ANXIOUS MOOD: ICD-10-CM

## 2022-09-14 RX ORDER — CITALOPRAM HYDROBROMIDE 40 MG/1
TABLET ORAL
Qty: 90 TABLET | Refills: 1 | OUTPATIENT
Start: 2022-09-14

## 2022-09-18 ENCOUNTER — NURSE TRIAGE (OUTPATIENT)
Dept: NURSING | Facility: CLINIC | Age: 42
End: 2022-09-18

## 2022-09-18 NOTE — TELEPHONE ENCOUNTER
"Swollen elbow. Remembered hitting it on something when working outside. Thinks he got bit by something. 50 cent piece swelling, red around that area. On going for two days. Right elbow. He is familiar with where urgent care is located and will head there today.  Negrita Terrell RN  [facility]      Reason for Disposition    [1] Looks infected (spreading redness, pus) AND [2] large red area (> 2 in. or 5 cm)    Additional Information    Negative: Shock suspected (e.g., cold/pale/clammy skin, too weak to stand, low BP, rapid pulse)    Negative: [1] Similar pain previously AND [2] it was from \"heart attack\"    Negative: [1] Similar pain previously AND [2] it was from \"angina\" AND [3] not relieved by nitroglycerin    Negative: Sounds like a life-threatening emergency to the triager    Negative: Followed an elbow injury    Negative: Chest pain    Negative: Wound looks infected    Negative: Elbow swelling is main symptom    Negative: Arm pain is main symptom    Negative: Difficulty breathing or unusual sweating (e.g., sweating without exertion)    Negative: [1] Age > 40 AND [2] associated chest or jaw pain AND [3] pain lasts > 5 minutes    Negative: [1] Swollen joint AND [2] fever    Negative: [1] Red area or streak AND [2] fever    Negative: Patient sounds very sick or weak to the triager    Negative: [1] SEVERE pain AND [2] not improved 2 hours after pain medicine     Pain at 7.    Protocols used: ELBOW PAIN-A-AH      "

## 2022-09-25 ENCOUNTER — HEALTH MAINTENANCE LETTER (OUTPATIENT)
Age: 42
End: 2022-09-25

## 2022-11-07 DIAGNOSIS — F43.22 ADJUSTMENT DISORDER WITH ANXIOUS MOOD: ICD-10-CM

## 2022-11-07 RX ORDER — CITALOPRAM HYDROBROMIDE 40 MG/1
TABLET ORAL
Qty: 90 TABLET | Refills: 0 | Status: SHIPPED | OUTPATIENT
Start: 2022-11-07 | End: 2023-01-30

## 2022-11-07 NOTE — TELEPHONE ENCOUNTER
Medication Request  Medication name: citalopram (CELEXA) 40 MG tablet  Requested Pharmacy: [facility] # [id number]  When was patient last seen for this?:  06/30/21  Patient offered appointment:  Yes, 12/27/22 for annual Prev with Dr. Daryn Spence to leave a detailed message: yes    Please advise on bridge request.

## 2022-12-27 ENCOUNTER — OFFICE VISIT (OUTPATIENT)
Dept: FAMILY MEDICINE | Facility: CLINIC | Age: 42
End: 2022-12-27
Payer: COMMERCIAL

## 2022-12-27 VITALS
DIASTOLIC BLOOD PRESSURE: 68 MMHG | SYSTOLIC BLOOD PRESSURE: 110 MMHG | WEIGHT: 229.1 LBS | OXYGEN SATURATION: 98 % | HEART RATE: 66 BPM | HEIGHT: 71 IN | BODY MASS INDEX: 32.07 KG/M2

## 2022-12-27 DIAGNOSIS — Z00.00 PREVENTATIVE HEALTH CARE: ICD-10-CM

## 2022-12-27 DIAGNOSIS — R74.01 ELEVATED ALT MEASUREMENT: ICD-10-CM

## 2022-12-27 DIAGNOSIS — Z13.228 SCREENING FOR METABOLIC DISORDER: Primary | ICD-10-CM

## 2022-12-27 DIAGNOSIS — R06.83 SNORING: ICD-10-CM

## 2022-12-27 DIAGNOSIS — E66.09 CLASS 1 OBESITY DUE TO EXCESS CALORIES WITHOUT SERIOUS COMORBIDITY WITH BODY MASS INDEX (BMI) OF 31.0 TO 31.9 IN ADULT: ICD-10-CM

## 2022-12-27 DIAGNOSIS — E78.2 MIXED HYPERLIPIDEMIA: ICD-10-CM

## 2022-12-27 DIAGNOSIS — K62.5 BRBPR (BRIGHT RED BLOOD PER RECTUM): ICD-10-CM

## 2022-12-27 DIAGNOSIS — E55.9 VITAMIN D DEFICIENCY: ICD-10-CM

## 2022-12-27 DIAGNOSIS — F43.22 ADJUSTMENT DISORDER WITH ANXIOUS MOOD: ICD-10-CM

## 2022-12-27 DIAGNOSIS — E66.811 CLASS 1 OBESITY DUE TO EXCESS CALORIES WITHOUT SERIOUS COMORBIDITY WITH BODY MASS INDEX (BMI) OF 31.0 TO 31.9 IN ADULT: ICD-10-CM

## 2022-12-27 DIAGNOSIS — Z13.0 SCREENING, ANEMIA, DEFICIENCY, IRON: ICD-10-CM

## 2022-12-27 DIAGNOSIS — Z13.220 SCREENING, LIPID: ICD-10-CM

## 2022-12-27 DIAGNOSIS — R73.9 HYPERGLYCEMIA: ICD-10-CM

## 2022-12-27 DIAGNOSIS — F41.9 ANXIETY: ICD-10-CM

## 2022-12-27 PROCEDURE — 99396 PREV VISIT EST AGE 40-64: CPT | Performed by: FAMILY MEDICINE

## 2022-12-27 PROCEDURE — 99214 OFFICE O/P EST MOD 30 MIN: CPT | Mod: 25 | Performed by: FAMILY MEDICINE

## 2022-12-27 RX ORDER — DIPHENOXYLATE HYDROCHLORIDE AND ATROPINE SULFATE 2.5; .025 MG/1; MG/1
1 TABLET ORAL DAILY
COMMUNITY

## 2022-12-27 RX ORDER — CITALOPRAM HYDROBROMIDE 20 MG/1
20 TABLET ORAL DAILY
Qty: 90 TABLET | Refills: 1 | Status: SHIPPED | OUTPATIENT
Start: 2022-12-27 | End: 2023-08-18

## 2022-12-27 ASSESSMENT — ENCOUNTER SYMPTOMS
HEMATOCHEZIA: 0
DIZZINESS: 0
ARTHRALGIAS: 0
HEADACHES: 0
JOINT SWELLING: 0
MYALGIAS: 0
NAUSEA: 0
HEMATURIA: 0
EYE PAIN: 0
FEVER: 0
PALPITATIONS: 0
HEARTBURN: 0
COUGH: 0
ABDOMINAL PAIN: 0
DYSURIA: 0
SORE THROAT: 0
FREQUENCY: 0
NERVOUS/ANXIOUS: 1
WEAKNESS: 0
SHORTNESS OF BREATH: 0
PARESTHESIAS: 0
CHILLS: 0
CONSTIPATION: 0
DIARRHEA: 0

## 2022-12-27 ASSESSMENT — ANXIETY QUESTIONNAIRES
3. WORRYING TOO MUCH ABOUT DIFFERENT THINGS: NOT AT ALL
5. BEING SO RESTLESS THAT IT IS HARD TO SIT STILL: NOT AT ALL
6. BECOMING EASILY ANNOYED OR IRRITABLE: SEVERAL DAYS
2. NOT BEING ABLE TO STOP OR CONTROL WORRYING: NOT AT ALL
1. FEELING NERVOUS, ANXIOUS, OR ON EDGE: SEVERAL DAYS
7. FEELING AFRAID AS IF SOMETHING AWFUL MIGHT HAPPEN: SEVERAL DAYS
4. TROUBLE RELAXING: NOT AT ALL
GAD7 TOTAL SCORE: 3
IF YOU CHECKED OFF ANY PROBLEMS ON THIS QUESTIONNAIRE, HOW DIFFICULT HAVE THESE PROBLEMS MADE IT FOR YOU TO DO YOUR WORK, TAKE CARE OF THINGS AT HOME, OR GET ALONG WITH OTHER PEOPLE: NOT DIFFICULT AT ALL

## 2022-12-27 ASSESSMENT — PATIENT HEALTH QUESTIONNAIRE - PHQ9: SUM OF ALL RESPONSES TO PHQ QUESTIONS 1-9: 1

## 2022-12-27 NOTE — ASSESSMENT & PLAN NOTE
In 2021 ALT was elevated. elevated alt - today normal belly exam, order hepb/c/ruq us and rpt lft (if repeat lft is normal he may not schedule us).

## 2022-12-27 NOTE — ASSESSMENT & PLAN NOTE
Controlled on celexa 40 mg po q day. phq 9 and yaw done today and look great. He wants to lower his celexa from 40 mg to 20 mg po q day. Trial of celexa 20 mg ordered and he will let me know if her prefers the 40 mg dose.     Plan follow up in 6 months.

## 2022-12-27 NOTE — ASSESSMENT & PLAN NOTE
He has had hemorrhoids in the past, but not currently but he still has ongoing blood on the toilet paper intermittently so diagnostic colonoscopy is ordered.

## 2022-12-27 NOTE — ASSESSMENT & PLAN NOTE
Covid vaccine?   No vaccines currently due.   Colonoscopy:  There is no family or personal history, not indicated     Std testing desired:  offered  Osteoporosis prevention discussed.  vitamin d levels ordered. Recommend daily calcium and vitamin d intake to keep good bone health. Recommend weight bearing exercise, no tobacco, and limit alcohol  dexa - no indication.  Recommend sunscreen, exercise, & healthy diet.  Offered cbc, cmp, lipids and asked what other testing he  desires today  I have had an Advance Directives discussion with the patient.   Body mass index is 31.95 kg/m .   mychart active.     He has a living will he will get us a copy.

## 2022-12-27 NOTE — ASSESSMENT & PLAN NOTE
Obesity evidenced by bmi 31.95 today with chronic comorbid weight related conditions including snoring, vitamin D deficiency, elevated ALT and hyperlipidemia. Health lifestyle discussed, physician assisted weight reduction is an option.    Plan 6 month follow in 6 months recommended.

## 2022-12-27 NOTE — PROGRESS NOTES
Celexa, elevated alt addressed above and beyond usual scope of annual exam.     SUBJECTIVE:   CC: Zack is an 42 year old who presents for preventative health visit.   Patient has been advised of split billing requirements and indicates understanding: Yes  Healthy Habits:     Getting at least 3 servings of Calcium per day:  Yes    Bi-annual eye exam:  Yes    Dental care twice a year:  Yes    Sleep apnea or symptoms of sleep apnea:  Excessive snoring    Diet:  Regular (no restrictions)    Frequency of exercise:  2-3 days/week    Duration of exercise:  15-30 minutes    Taking medications regularly:  Yes    Medication side effects:  None    PHQ-2 Total Score: 0    Additional concerns today:  Yes    Today's PHQ-2 Score:   PHQ-2 ( 1999 [employer]) 12/27/2022   Q1: Little interest or pleasure in doing things 0   Q2: Feeling down, depressed or hopeless 0   PHQ-2 Score 0   Q1: Little interest or pleasure in doing things Not at all   Q2: Feeling down, depressed or hopeless Not at all   PHQ-2 Score 0     Social History     Tobacco Use     Smoking status: Never     Smokeless tobacco: Never   Substance Use Topics     Alcohol use: Not on file     Alcohol Use 12/27/2022   Prescreen: >3 drinks/day or >7 drinks/week? No     Last PSA: No results found for: PSA    Reviewed orders with patient. Reviewed health maintenance and updated orders accordingly - Yes    Reviewed and updated as needed this visit by clinical staff   Tobacco  Allergies  Meds  Problems  Med Hx  Surg Hx  Fam Hx  Soc   Hx        Reviewed and updated as needed this visit by Provider   Tobacco  Allergies  Meds  Problems  Med Hx  Surg Hx  Fam Hx  Soc   Hx         Review of Systems   Constitutional: Negative for chills and fever.   HENT: Negative for congestion, ear pain, hearing loss and sore throat.    Eyes: Negative for pain and visual disturbance.   Respiratory: Negative for cough and shortness of breath.    Cardiovascular: Negative for chest pain,  "palpitations and peripheral edema.   Gastrointestinal: Negative for abdominal pain, constipation, diarrhea, heartburn, hematochezia and nausea.   Genitourinary: Negative for dysuria, frequency, genital sores, hematuria, impotence, penile discharge and urgency.   Musculoskeletal: Negative for arthralgias, joint swelling and myalgias.   Skin: Negative for rash.   Neurological: Negative for dizziness, weakness, headaches and paresthesias.   Psychiatric/Behavioral: Negative for mood changes. The patient is nervous/anxious.      OBJECTIVE:   /68 (BP Location: Left arm, Patient Position: Left side, Cuff Size: Adult Large)   Pulse 66   Ht 1.803 m (5' 11\")   Wt 103.9 kg (229 lb 1.6 oz)   SpO2 98%   BMI 31.95 kg/m      Physical Exam  Constitutional:       Appearance: Normal appearance.   HENT:      Head: Normocephalic and atraumatic.   Cardiovascular:      Rate and Rhythm: Normal rate and regular rhythm.      Heart sounds: Normal heart sounds.   Pulmonary:      Effort: Pulmonary effort is normal.      Breath sounds: Normal breath sounds.   Abdominal:      General: Bowel sounds are normal.      Palpations: Abdomen is soft.   Genitourinary:     Comments: declined  Musculoskeletal:         General: Normal range of motion.      Cervical back: Normal range of motion and neck supple.   Neurological:      General: No focal deficit present.      Mental Status: He is alert.   Psychiatric:         Behavior: Behavior normal.         Thought Content: Thought content normal.         Judgment: Judgment normal.           ASSESSMENT/PLAN:     Problem List Items Addressed This Visit        Respiratory    Snoring     Sleep study offered in the past, re offered today.          Relevant Orders    Adult Sleep Eval & Management  Referral       Digestive    Vitamin D deficiency     Will check vit d level and titrate intake prn. Chart reviewed 2021 vit d 43.9         Relevant Orders    Vitamin D Deficiency    Class 1 obesity due " to excess calories without serious comorbidity with body mass index (BMI) of 31.0 to 31.9 in adult     Obesity evidenced by bmi 31.95 today with chronic comorbid weight related conditions including snoring, vitamin D deficiency, elevated ALT and hyperlipidemia. Health lifestyle discussed, physician assisted weight reduction is an option.    Plan 6 month follow in 6 months recommended.          BRBPR (bright red blood per rectum)     He has had hemorrhoids in the past, but not currently but he still has ongoing blood on the toilet paper intermittently so diagnostic colonoscopy is ordered.          Relevant Orders    Adult GI  Referral - Procedure Only       Endocrine    Mixed hyperlipidemia     tchol, tg 94, hdl 50, ldl 133.   Weight reduction recommended, and will recheck today.            Behavioral    RESOLVED: Adjustment disorder     Consolidating chart, see anxiety in the problem list.             Other    Preventative health care     Covid vaccine?   No vaccines currently due.   Colonoscopy:  There is no family or personal history, not indicated     Std testing desired:  offered  Osteoporosis prevention discussed.  vitamin d levels ordered. Recommend daily calcium and vitamin d intake to keep good bone health. Recommend weight bearing exercise, no tobacco, and limit alcohol  dexa - no indication.  Recommend sunscreen, exercise, & healthy diet.  Offered cbc, cmp, lipids and asked what other testing he  desires today  I have had an Advance Directives discussion with the patient.   Body mass index is 31.95 kg/m .   mychart active.     He has a living will he will get us a copy.         Anxiety     Controlled on celexa 40 mg po q day. phq 9 and yaw done today and look great. He wants to lower his celexa from 40 mg to 20 mg po q day. Trial of celexa 20 mg ordered and he will let me know if her prefers the 40 mg dose.     Plan follow up in 6 months.          Relevant Medications    citalopram (CELEXA) 20 MG  "tablet    Elevated ALT measurement     In 2021 ALT was elevated. elevated alt - today normal belly exam, order hepb/c/ruq us and rpt lft (if repeat lft is normal he may not schedule us).          Relevant Orders    Hepatitis C antibody    Hepatitis B surface antigen    US Abdomen Limited   Other Visit Diagnoses     Screening for metabolic disorder    -  Primary    Relevant Orders    Comprehensive metabolic panel (BMP + Alb, Alk Phos, ALT, AST, Total. Bili, TP)    Screening, anemia, deficiency, iron        Relevant Orders    CBC with platelets    Screening, lipid        Relevant Orders    Lipid Profile          Patient has been advised of split billing requirements and indicates understanding: Yes      COUNSELING:   Reviewed preventive health counseling, as reflected in patient instructions       Regular exercise       Healthy diet/nutrition       Safe sex practices/STD prevention       Advance Care Planning      BMI:   Estimated body mass index is 31.95 kg/m  as calculated from the following:    Height as of this encounter: 1.803 m (5' 11\").    Weight as of this encounter: 103.9 kg (229 lb 1.6 oz).   Weight management plan: Discussed healthy diet and exercise guidelines      He reports that he has never smoked. He has never used smokeless tobacco.      Valerie Stearns MD  [facility]  "

## 2022-12-29 ENCOUNTER — LAB (OUTPATIENT)
Dept: LAB | Facility: CLINIC | Age: 42
End: 2022-12-29
Payer: COMMERCIAL

## 2022-12-29 DIAGNOSIS — R74.01 ELEVATED ALT MEASUREMENT: ICD-10-CM

## 2022-12-29 DIAGNOSIS — E55.9 VITAMIN D DEFICIENCY: ICD-10-CM

## 2022-12-29 DIAGNOSIS — R73.9 HYPERGLYCEMIA: ICD-10-CM

## 2022-12-29 DIAGNOSIS — Z13.228 SCREENING FOR METABOLIC DISORDER: ICD-10-CM

## 2022-12-29 DIAGNOSIS — Z13.220 SCREENING, LIPID: ICD-10-CM

## 2022-12-29 DIAGNOSIS — Z13.0 SCREENING, ANEMIA, DEFICIENCY, IRON: ICD-10-CM

## 2022-12-29 LAB
ALBUMIN SERPL BCG-MCNC: 4.2 G/DL (ref 3.5–5.2)
ALP SERPL-CCNC: 100 U/L (ref 40–129)
ALT SERPL W P-5'-P-CCNC: 32 U/L (ref 10–50)
ANION GAP SERPL CALCULATED.3IONS-SCNC: 12 MMOL/L (ref 7–15)
AST SERPL W P-5'-P-CCNC: 25 U/L (ref 10–50)
BILIRUB SERPL-MCNC: 0.3 MG/DL
BUN SERPL-MCNC: 12 MG/DL (ref 6–20)
CALCIUM SERPL-MCNC: 8.9 MG/DL (ref 8.6–10)
CHLORIDE SERPL-SCNC: 103 MMOL/L (ref 98–107)
CHOLEST SERPL-MCNC: 208 MG/DL
CREAT SERPL-MCNC: 1.02 MG/DL (ref 0.67–1.17)
DEPRECATED CALCIDIOL+CALCIFEROL SERPL-MC: 33 UG/L (ref 20–75)
DEPRECATED HCO3 PLAS-SCNC: 26 MMOL/L (ref 22–29)
ERYTHROCYTE [DISTWIDTH] IN BLOOD BY AUTOMATED COUNT: 12.8 % (ref 10–15)
GFR SERPL CREATININE-BSD FRML MDRD: >90 ML/MIN/1.73M2
GLUCOSE SERPL-MCNC: 111 MG/DL (ref 70–99)
HBV SURFACE AG SERPL QL IA: NONREACTIVE
HCT VFR BLD AUTO: 42 % (ref 40–53)
HCV AB SERPL QL IA: NONREACTIVE
HDLC SERPL-MCNC: 36 MG/DL
HGB BLD-MCNC: 14.7 G/DL (ref 13.3–17.7)
LDLC SERPL CALC-MCNC: 137 MG/DL
MCH RBC QN AUTO: 28.4 PG (ref 26.5–33)
MCHC RBC AUTO-ENTMCNC: 35 G/DL (ref 31.5–36.5)
MCV RBC AUTO: 81 FL (ref 78–100)
NONHDLC SERPL-MCNC: 172 MG/DL
PLATELET # BLD AUTO: 164 10E3/UL (ref 150–450)
POTASSIUM SERPL-SCNC: 4.1 MMOL/L (ref 3.4–5.3)
PROT SERPL-MCNC: 6.8 G/DL (ref 6.4–8.3)
RBC # BLD AUTO: 5.17 10E6/UL (ref 4.4–5.9)
SODIUM SERPL-SCNC: 141 MMOL/L (ref 136–145)
TRIGL SERPL-MCNC: 173 MG/DL
WBC # BLD AUTO: 4.7 10E3/UL (ref 4–11)

## 2022-12-29 PROCEDURE — 83036 HEMOGLOBIN GLYCOSYLATED A1C: CPT

## 2022-12-29 PROCEDURE — 36415 COLL VENOUS BLD VENIPUNCTURE: CPT

## 2022-12-29 PROCEDURE — 80061 LIPID PANEL: CPT

## 2022-12-29 PROCEDURE — 86803 HEPATITIS C AB TEST: CPT

## 2022-12-29 PROCEDURE — 82306 VITAMIN D 25 HYDROXY: CPT

## 2022-12-29 PROCEDURE — 87340 HEPATITIS B SURFACE AG IA: CPT

## 2022-12-29 PROCEDURE — 85027 COMPLETE CBC AUTOMATED: CPT

## 2022-12-29 PROCEDURE — 80053 COMPREHEN METABOLIC PANEL: CPT

## 2022-12-30 LAB — HBA1C MFR BLD: 5.4 % (ref 0–5.6)

## 2023-02-21 NOTE — ASSESSMENT & PLAN NOTE
Controlled with celexa 20 mg po q day for years.  However he wants to try increased dose, see [employer] message 7/2/2021 -     Discontinue celexa 20 mg po q day  Start celexa 40 mg po q day.   phq 2 - 0     Mid-Level Procedure Text (A): After obtaining clear surgical margins the patient was sent to a mid-level provider for surgical repair.  The patient understands they will receive post-surgical care and follow-up from the mid-level provider.

## 2023-04-05 ENCOUNTER — TRANSFERRED RECORDS (OUTPATIENT)
Dept: HEALTH INFORMATION MANAGEMENT | Facility: CLINIC | Age: 43
End: 2023-04-05
Payer: COMMERCIAL

## 2023-04-18 ENCOUNTER — TRANSFERRED RECORDS (OUTPATIENT)
Dept: HEALTH INFORMATION MANAGEMENT | Facility: CLINIC | Age: 43
End: 2023-04-18
Payer: COMMERCIAL

## 2023-08-18 DIAGNOSIS — F41.9 ANXIETY: ICD-10-CM

## 2023-08-18 RX ORDER — CITALOPRAM HYDROBROMIDE 20 MG/1
20 TABLET ORAL DAILY
Qty: 90 TABLET | Refills: 1 | Status: SHIPPED | OUTPATIENT
Start: 2023-08-18 | End: 2024-02-28

## 2023-08-18 NOTE — TELEPHONE ENCOUNTER
"Routing refill request to provider for review/approval because:  PCP review    Last Written Prescription Date:  12/27/22  Last Fill Quantity: 90,  # refills: 1   Last office visit provider:  1/30/23     Requested Prescriptions   Pending Prescriptions Disp Refills    citalopram (CELEXA) 20 MG tablet [Pharmacy Med Name: CITALOPRAM HBR 20 MG TABLET] 90 tablet 1     Sig: TAKE 1 TABLET BY MOUTH EVERY DAY       SSRIs Protocol Passed - 8/18/2023  3:01 AM        Passed - Recent (12 mo) or future (30 days) visit within the authorizing provider's specialty     Patient has had an office visit with the authorizing provider or a provider within the authorizing providers department within the previous 12 mos or has a future within next 30 days. See \"Patient Info\" tab in inbasket, or \"Choose Columns\" in Meds & Orders section of the refill encounter.              Passed - Medication is active on med list        Passed - Patient is age 18 or older             Valerie Sarah RN 08/18/23 3:05 AM  "

## 2024-02-09 ENCOUNTER — OFFICE VISIT (OUTPATIENT)
Dept: FAMILY MEDICINE | Facility: CLINIC | Age: 44
End: 2024-02-09
Payer: COMMERCIAL

## 2024-02-09 ENCOUNTER — TELEPHONE (OUTPATIENT)
Dept: FAMILY MEDICINE | Facility: CLINIC | Age: 44
End: 2024-02-09

## 2024-02-09 VITALS
HEART RATE: 82 BPM | OXYGEN SATURATION: 97 % | TEMPERATURE: 98.9 F | BODY MASS INDEX: 31.46 KG/M2 | HEIGHT: 71 IN | DIASTOLIC BLOOD PRESSURE: 84 MMHG | RESPIRATION RATE: 16 BRPM | SYSTOLIC BLOOD PRESSURE: 136 MMHG | WEIGHT: 224.7 LBS

## 2024-02-09 DIAGNOSIS — M54.50 ACUTE BILATERAL LOW BACK PAIN WITHOUT SCIATICA: Primary | ICD-10-CM

## 2024-02-09 PROCEDURE — 99213 OFFICE O/P EST LOW 20 MIN: CPT

## 2024-02-09 RX ORDER — METHYLPREDNISOLONE 4 MG
TABLET, DOSE PACK ORAL
Qty: 21 TABLET | Refills: 0 | Status: SHIPPED | OUTPATIENT
Start: 2024-02-09 | End: 2024-02-28

## 2024-02-09 RX ORDER — METHOCARBAMOL 500 MG/1
500 TABLET, FILM COATED ORAL 4 TIMES DAILY PRN
Qty: 20 TABLET | Refills: 0 | Status: SHIPPED | OUTPATIENT
Start: 2024-02-09 | End: 2024-02-28

## 2024-02-09 ASSESSMENT — ANXIETY QUESTIONNAIRES
GAD7 TOTAL SCORE: 5
7. FEELING AFRAID AS IF SOMETHING AWFUL MIGHT HAPPEN: SEVERAL DAYS
7. FEELING AFRAID AS IF SOMETHING AWFUL MIGHT HAPPEN: SEVERAL DAYS
IF YOU CHECKED OFF ANY PROBLEMS ON THIS QUESTIONNAIRE, HOW DIFFICULT HAVE THESE PROBLEMS MADE IT FOR YOU TO DO YOUR WORK, TAKE CARE OF THINGS AT HOME, OR GET ALONG WITH OTHER PEOPLE: NOT DIFFICULT AT ALL
2. NOT BEING ABLE TO STOP OR CONTROL WORRYING: SEVERAL DAYS
5. BEING SO RESTLESS THAT IT IS HARD TO SIT STILL: NOT AT ALL
4. TROUBLE RELAXING: NOT AT ALL
1. FEELING NERVOUS, ANXIOUS, OR ON EDGE: SEVERAL DAYS
8. IF YOU CHECKED OFF ANY PROBLEMS, HOW DIFFICULT HAVE THESE MADE IT FOR YOU TO DO YOUR WORK, TAKE CARE OF THINGS AT HOME, OR GET ALONG WITH OTHER PEOPLE?: NOT DIFFICULT AT ALL
GAD7 TOTAL SCORE: 5
3. WORRYING TOO MUCH ABOUT DIFFERENT THINGS: SEVERAL DAYS
6. BECOMING EASILY ANNOYED OR IRRITABLE: SEVERAL DAYS

## 2024-02-09 ASSESSMENT — PAIN SCALES - GENERAL: PAINLEVEL: SEVERE PAIN (6)

## 2024-02-09 NOTE — PROGRESS NOTES
Assessment & Plan   Problem List Items Addressed This Visit       Acute bilateral low back pain without sciatica - Primary     Presents today with 3 weeks worth of low back pain without sciatica.  He has no red flag symptoms on exam or history.  His physical exam is relatively unrevealing and I am unable to reproduce any significant symptoms with provocative testing or range of motion exercises.  Discussed that I believe the most likely etiology of his pain at this time is a lumbar muscle sprain.  Will plan to treat with Robaxin and a Medrol Dosepak.  Expected therapeutic effects and potential side effects of these medications were discussed today.  I have also provided him with stretching exercises that will be helpful for low back pain.  We discussed that if anytime symptoms worsen, he develops any red flag symptoms, or they persist despite this plan, I would recommend formal physical therapy with a progression to a sports medicine referral if symptoms fail to respond.  Patient expressed understanding of and agreement with this plan.  All questions were answered.         Relevant Medications    methylPREDNISolone (MEDROL DOSEPAK) 4 MG tablet therapy pack    methocarbamol (ROBAXIN) 500 MG tablet      Subjective   Zack is a 43 year old, presenting for the following health issues:  Back Pain (Lower back pain radiating to the sciatic and hips area. 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  On average, pain is 2-3/10 . At worst it is 7/10.  Aggravating factors include going up stairs and getting into his vehicle, lifting things.  He has tried stretching (not helpful) and Tylenol (helpful). Ice is helpful but just in the moment.   No paresthesias, saddle anesthesia, incontinence, weakness in the lower extremities. No urinary symptoms.  Reports that he has had similar symptoms in the past, but they don't tend to last this long.     History of Present Illness       Back Pain:  He presents for follow up of back pain. Patient's back pain is a new problem.    Original cause of back pain: lifting  First noticed back pain: 1-4 weeks ago  Patient feels back pain: dailyLocation of back pain:  Right lower back and right middle of back  Description of back pain: dull ache, sharp and shooting  Back pain spreads: right buttocks    Since patient first noticed back pain, pain is: always present, but gets better and worse  Does back pain interfere with his job:  No  On a scale of 1-10 (10 being the worst), patient describes pain as:  2  What makes back pain worse: bending, coughing, lying down, standing and twisting   Acupuncture: not tried  Acetaminophen: helpful  Activity or exercise: not helpful  Chiropractor:  Not tried  Cold: helpful  Heat: helpful  Massage: helpful  Muscle relaxants: not tried  NSAIDS: not helpful  Opioids: not tried  Physical Therapy: not tried  Rest: not helpful  Steroid Injection: not tried  Stretching: not helpful  Surgery: not tried  TENS unit: not tried  Topical pain relievers: not helpful  Other healthcare providers patient is seeing for back pain: None    He eats 2-3 servings of fruits and vegetables daily.He consumes 0 sweetened beverage(s) daily.He exercises with enough effort to increase his heart rate 30 to 60 minutes per day.  He exercises with enough effort to increase his heart rate 3 or less days per week. He is missing 1 dose(s) of medications per  "week.        Objective    /84 (BP Location: Left arm, Patient Position: Sitting, Cuff Size: Adult Large)   Pulse 82   Temp 98.9  F (37.2  C) (Oral)   Resp 16   Ht 1.803 m (5' 11\")   Wt 101.9 kg (224 lb 11.2 oz)   SpO2 97%   BMI 31.34 kg/m    Body mass index is 31.34 kg/m .    Physical Exam  Vitals and nursing note reviewed.   Constitutional:       General: He is not in acute distress.     Appearance: Normal appearance.   Cardiovascular:      Rate and Rhythm: Normal rate and regular rhythm.   Pulmonary:      Effort: Pulmonary effort is normal. No respiratory distress.   Musculoskeletal:      Cervical back: No tenderness or bony tenderness.      Thoracic back: Normal. No tenderness or bony tenderness.      Lumbar back: Tenderness (right paraspinal muscles) present. Normal range of motion. Negative right straight leg raise test and negative left straight leg raise test.      Comments: NEGATIVE hip thrust and BLANKA bilaterally   Skin:     General: Skin is warm.      Findings: No rash.   Neurological:      Mental Status: He is alert.   Psychiatric:         Mood and Affect: Mood normal.         Behavior: Behavior normal.         Thought Content: Thought content normal.              Signed Electronically by: IFTIKHAR Henriquez CNP    Answers submitted by the patient for this visit:  ELGIN-7 (Submitted on 2/9/2024)  ELGIN 7 TOTAL SCORE: 5    "

## 2024-02-09 NOTE — ASSESSMENT & PLAN NOTE
Presents today with 3 weeks worth of low back pain without sciatica.  He has no red flag symptoms on exam or history.  His physical exam is relatively unrevealing and I am unable to reproduce any significant symptoms with provocative testing or range of motion exercises.  Discussed that I believe the most likely etiology of his pain at this time is a lumbar muscle sprain.  Will plan to treat with Robaxin and a Medrol Dosepak.  Expected therapeutic effects and potential side effects of these medications were discussed today.  I have also provided him with stretching exercises that will be helpful for low back pain.  We discussed that if anytime symptoms worsen, he develops any red flag symptoms, or they persist despite this plan, I would recommend formal physical therapy with a progression to a sports medicine referral if symptoms fail to respond.  Patient expressed understanding of and agreement with this plan.  All questions were answered.

## 2024-02-09 NOTE — TELEPHONE ENCOUNTER
Pt calling again checking the status of the request. He would like a call back once sent.    [facility]# [id number]

## 2024-02-09 NOTE — TELEPHONE ENCOUNTER
General Call      Reason for Call:  patient called in and wanted to know why no medications were sent to his pharmacy from his visit today. He stated he should have been prescribed flexeril and some type of patch for his back  Pharmacy attached.       Could we send this information to you in [place] or would you prefer to receive a phone call?:   Patient would prefer a phone call   Okay to leave a detailed message?: Yes at Cell number on file:    Telephone Information:   Mobile [phone]

## 2024-02-28 ENCOUNTER — OFFICE VISIT (OUTPATIENT)
Dept: FAMILY MEDICINE | Facility: CLINIC | Age: 44
End: 2024-02-28
Payer: COMMERCIAL

## 2024-02-28 VITALS
TEMPERATURE: 98.5 F | OXYGEN SATURATION: 99 % | RESPIRATION RATE: 16 BRPM | BODY MASS INDEX: 31.15 KG/M2 | DIASTOLIC BLOOD PRESSURE: 86 MMHG | HEIGHT: 71 IN | WEIGHT: 222.5 LBS | HEART RATE: 80 BPM | SYSTOLIC BLOOD PRESSURE: 131 MMHG

## 2024-02-28 DIAGNOSIS — E55.9 VITAMIN D DEFICIENCY: ICD-10-CM

## 2024-02-28 DIAGNOSIS — F41.9 ANXIETY: ICD-10-CM

## 2024-02-28 DIAGNOSIS — Z13.0 SCREENING, ANEMIA, DEFICIENCY, IRON: ICD-10-CM

## 2024-02-28 DIAGNOSIS — Z00.00 ROUTINE GENERAL MEDICAL EXAMINATION AT A HEALTH CARE FACILITY: ICD-10-CM

## 2024-02-28 DIAGNOSIS — R74.01 ELEVATED ALT MEASUREMENT: ICD-10-CM

## 2024-02-28 DIAGNOSIS — M25.562 CHRONIC PAIN OF LEFT KNEE: ICD-10-CM

## 2024-02-28 DIAGNOSIS — E78.2 MIXED HYPERLIPIDEMIA: Primary | ICD-10-CM

## 2024-02-28 DIAGNOSIS — E66.811 CLASS 1 OBESITY DUE TO EXCESS CALORIES WITHOUT SERIOUS COMORBIDITY WITH BODY MASS INDEX (BMI) OF 31.0 TO 31.9 IN ADULT: ICD-10-CM

## 2024-02-28 DIAGNOSIS — E66.09 CLASS 1 OBESITY DUE TO EXCESS CALORIES WITHOUT SERIOUS COMORBIDITY WITH BODY MASS INDEX (BMI) OF 31.0 TO 31.9 IN ADULT: ICD-10-CM

## 2024-02-28 DIAGNOSIS — K64.9 HEMORRHOIDS, UNSPECIFIED HEMORRHOID TYPE: ICD-10-CM

## 2024-02-28 DIAGNOSIS — R06.83 SNORING: ICD-10-CM

## 2024-02-28 DIAGNOSIS — G89.29 CHRONIC PAIN OF LEFT KNEE: ICD-10-CM

## 2024-02-28 DIAGNOSIS — Z00.00 HEALTH CARE MAINTENANCE: ICD-10-CM

## 2024-02-28 PROCEDURE — 99396 PREV VISIT EST AGE 40-64: CPT | Performed by: FAMILY MEDICINE

## 2024-02-28 PROCEDURE — 99213 OFFICE O/P EST LOW 20 MIN: CPT | Mod: 25 | Performed by: FAMILY MEDICINE

## 2024-02-28 RX ORDER — CITALOPRAM HYDROBROMIDE 20 MG/1
20 TABLET ORAL DAILY
Qty: 90 TABLET | Refills: 1 | Status: SHIPPED | OUTPATIENT
Start: 2024-02-28 | End: 2024-09-04

## 2024-02-28 SDOH — HEALTH STABILITY: PHYSICAL HEALTH: ON AVERAGE, HOW MANY DAYS PER WEEK DO YOU ENGAGE IN MODERATE TO STRENUOUS EXERCISE (LIKE A BRISK WALK)?: 3 DAYS

## 2024-02-28 SDOH — HEALTH STABILITY: PHYSICAL HEALTH: ON AVERAGE, HOW MANY MINUTES DO YOU ENGAGE IN EXERCISE AT THIS LEVEL?: 30 MIN

## 2024-02-28 ASSESSMENT — ANXIETY QUESTIONNAIRES
GAD7 TOTAL SCORE: 1
7. FEELING AFRAID AS IF SOMETHING AWFUL MIGHT HAPPEN: NOT AT ALL
8. IF YOU CHECKED OFF ANY PROBLEMS, HOW DIFFICULT HAVE THESE MADE IT FOR YOU TO DO YOUR WORK, TAKE CARE OF THINGS AT HOME, OR GET ALONG WITH OTHER PEOPLE?: NOT DIFFICULT AT ALL
GAD7 TOTAL SCORE: 1
7. FEELING AFRAID AS IF SOMETHING AWFUL MIGHT HAPPEN: NOT AT ALL
2. NOT BEING ABLE TO STOP OR CONTROL WORRYING: NOT AT ALL
6. BECOMING EASILY ANNOYED OR IRRITABLE: NOT AT ALL
4. TROUBLE RELAXING: NOT AT ALL
3. WORRYING TOO MUCH ABOUT DIFFERENT THINGS: SEVERAL DAYS
IF YOU CHECKED OFF ANY PROBLEMS ON THIS QUESTIONNAIRE, HOW DIFFICULT HAVE THESE PROBLEMS MADE IT FOR YOU TO DO YOUR WORK, TAKE CARE OF THINGS AT HOME, OR GET ALONG WITH OTHER PEOPLE: NOT DIFFICULT AT ALL
1. FEELING NERVOUS, ANXIOUS, OR ON EDGE: NOT AT ALL
5. BEING SO RESTLESS THAT IT IS HARD TO SIT STILL: NOT AT ALL
GAD7 TOTAL SCORE: 1

## 2024-02-28 ASSESSMENT — PATIENT HEALTH QUESTIONNAIRE - PHQ9
SUM OF ALL RESPONSES TO PHQ QUESTIONS 1-9: 0
SUM OF ALL RESPONSES TO PHQ QUESTIONS 1-9: 0
10. IF YOU CHECKED OFF ANY PROBLEMS, HOW DIFFICULT HAVE THESE PROBLEMS MADE IT FOR YOU TO DO YOUR WORK, TAKE CARE OF THINGS AT HOME, OR GET ALONG WITH OTHER PEOPLE: NOT DIFFICULT AT ALL

## 2024-02-28 ASSESSMENT — SOCIAL DETERMINANTS OF HEALTH (SDOH): HOW OFTEN DO YOU GET TOGETHER WITH FRIENDS OR RELATIVES?: ONCE A WEEK

## 2024-02-28 NOTE — PATIENT INSTRUCTIONS
Preventive Care Advice   This is general advice given by our system to help you stay healthy. However, your care team may have specific advice just for you. Please talk to your care team about your preventive care needs.  Nutrition  Eat 5 or more servings of fruits and vegetables each day.  Try wheat bread, brown rice and whole grain pasta (instead of white bread, rice, and pasta).  Get enough calcium and vitamin D. Check the label on foods and aim for 100% of the RDA (recommended daily allowance).  Lifestyle  Exercise at least 150 minutes each week   (30 minutes a day, 5 days a week).  Do muscle strengthening activities 2 days a week. These help control your weight and prevent disease.  No smoking.  Wear sunscreen to prevent skin cancer.  Have a dental exam and cleaning every 6 months.  Yearly exams  See your health care team every year to talk about:  Any changes in your health.  Any medicines your care team has prescribed.  Preventive care, family planning, and ways to prevent chronic diseases.  Shots (vaccines)   HPV shots (up to age 26), if you've never had them before.  Hepatitis B shots (up to age 59), if you've never had them before.  COVID-19 shot: Get this shot when it's due.  Flu shot: Get a flu shot every year.  Tetanus shot: Get a tetanus shot every 10 years.  Pneumococcal, hepatitis A, and RSV shots: Ask your care team if you need these based on your risk.  Shingles shot (for age 50 and up).  General health tests  Diabetes screening:  Starting at age 35, Get screened for diabetes at least every 3 years.  If you are younger than age 35, ask your care team if you should be screened for diabetes.  Cholesterol test: At age 39, start having a cholesterol test every 5 years, or more often if advised.  Bone density scan (DEXA): At age 50, ask your care team if you should have this scan for osteoporosis (brittle bones).  Hepatitis C: Get tested at least once in your life.  STIs (sexually transmitted  infections)  Before age 24: Ask your care team if you should be screened for STIs.  After age 24: Get screened for STIs if you're at risk. You are at risk for STIs (including HIV) if:  You are sexually active with more than one person.  You don't use condoms every time.  You or a partner was diagnosed with a sexually transmitted infection.  If you are at risk for HIV, ask about PrEP medicine to prevent HIV.  Get tested for HIV at least once in your life, whether you are at risk for HIV or not.  Cancer screening tests  Cervical cancer screening: If you have a cervix, begin getting regular cervical cancer screening tests at age 21. Most people who have regular screenings with normal results can stop after age 65. Talk about this with your provider.  Breast cancer scan (mammogram): If you've ever had breasts, begin having regular mammograms starting at age 40. This is a scan to check for breast cancer.  Colon cancer screening: It is important to start screening for colon cancer at age 45.  Have a colonoscopy test every 10 years (or more often if you're at risk) Or, ask your provider about stool tests like a FIT test every year or Cologuard test every 3 years.  To learn more about your testing options, visit: https://www."[employer]/989578.pdf.  For help making a decision, visit: https://bit.ly/rr63447.  Prostate cancer screening test: If you have a prostate and are age 55 to 69, ask your provider if you would benefit from a yearly prostate cancer screening test.  Lung cancer screening: If you are a current or former smoker age 50 to 80, ask your care team if ongoing lung cancer screenings are right for you.  For informational purposes only. Not to replace the advice of your health care provider. Copyright   2023 [employer]. All rights reserved. Clinically reviewed by the [facility] Transitions Program. [employer] 275291 - REV 01/24.

## 2024-02-28 NOTE — ASSESSMENT & PLAN NOTE
Colonoscopy: - done 4/2023 polyps noted, repeat in 2028   Std testing desired:  offered  Osteoporosis prevention discussed.  vitamin d levels ordered. Recommend daily calcium and vitamin d intake to keep good bone health. Recommend weight bearing exercise, no tobacco, and limit alcohol  Dexa - no indication.  Recommend sunscreen, exercise, & healthy diet.  Offered cbc, cmp, lipids and asked what other testing he  desires today  I have had an Advance Directives discussion with the patient. He has this at home.   Body mass index is 31.47 kg/m .   jose active.

## 2024-02-28 NOTE — ASSESSMENT & PLAN NOTE
Left knee pain, ongoing for years, so he has appt with knee specialist Dr. Campbell at [facility]. Gets intermittent cortisone injections

## 2024-02-28 NOTE — PROGRESS NOTES
Preventive Care Visit  [facility]  Valerie Stearns MD, Family Medicine  Feb 28, 2024    Assessment & Plan   Problem List Items Addressed This Visit          Nervous and Auditory    Chronic pain of left knee     Left knee pain, ongoing for years, so he has appt with knee specialist Dr. Campbell at [facility]. Gets intermittent cortisone injections            Respiratory    Snoring     Snoring, he thinks this is better.             Digestive    Vitamin D deficiency     Vit d deficiency, recheck and titrate meds. .         Relevant Orders    Vitamin D Deficiency    Class 1 obesity due to excess calories without serious comorbidity with body mass index (BMI) of 31.0 to 31.9 in adult     Obesity with bmi 31.47 and chronic comorbid conditions including hyperlipidemia, snoring         Hemorrhoids     Hemorrhoids improved, but still present, colonoscopy done 4/2023            Endocrine    Mixed hyperlipidemia - Primary     Hyperlipidemia, recheck lipids today.          Relevant Orders    Lipid Profile       Other    Health care maintenance     Colonoscopy: - done 4/2023 polyps noted, repeat in 2028   Std testing desired:  offered  Osteoporosis prevention discussed.  vitamin d levels ordered. Recommend daily calcium and vitamin d intake to keep good bone health. Recommend weight bearing exercise, no tobacco, and limit alcohol  Dexa - no indication.  Recommend sunscreen, exercise, & healthy diet.  Offered cbc, cmp, lipids and asked what other testing he  desires today  I have had an Advance Directives discussion with the patient. He has this at home.   Body mass index is 31.47 kg/m .   mychart active.          Anxiety     Anxiety, celexa 20 mg po q day working well.   Follow up in 6 months.          Relevant Medications    citalopram (CELEXA) 20 MG tablet    Elevated ALT measurement     Elevated ALT. Resolved in 2022, will continue to monitor.          Relevant Orders    Comprehensive metabolic panel (BMP  "+ Alb, Alk Phos, ALT, AST, Total. Bili, TP)     Other Visit Diagnoses       Screening, anemia, deficiency, iron        Relevant Orders    CBC with platelets    Routine general medical examination at a health care facility                 Patient has been advised of split billing requirements and indicates understanding: Yes         BMI  Estimated body mass index is 31.47 kg/m  as calculated from the following:    Height as of this encounter: 1.791 m (5' 10.5\").    Weight as of this encounter: 100.9 kg (222 lb 8 oz).   Weight management plan: Discussed healthy diet and exercise guidelines    Counseling  Appropriate preventive services were discussed with this patient, including applicable screening as appropriate for fall prevention, nutrition, physical activity, Tobacco-use cessation, weight loss and cognition.  Checklist reviewing preventive services available has been given to the patient.  Reviewed patient's diet, addressing concerns and/or questions.   He is at risk for lack of exercise and has been provided with information to increase physical activity for the benefit of his well-being.       Yamel Dixon is a 43 year old, presenting for the following:  Physical (Pt. Nonfasting.)        2/28/2024     3:43 PM   Additional Questions   Roomed by sac   Accompanied by self         2/28/2024     3:43 PM   Patient Reported Additional Medications   Patient reports taking the following new medications no        Health Care Directive  Patient does not have a Health Care Directive or Living Will: Patient states has Advance Directive and will bring in a copy to clinic.        2/28/2024   General Health   How would you rate your overall physical health? (!) FAIR   Feel stress (tense, anxious, or unable to sleep) Not at all         2/28/2024   Nutrition   Three or more servings of calcium each day? Yes   Diet: Other   If other, please elaborate: Intermittent Fasting   How many servings of fruit and vegetables per day? " (!) 2-3   How many sweetened beverages each day? 0-1         2/28/2024   Exercise   Days per week of moderate/strenous exercise 3 days   Average minutes spent exercising at this level 30 min         2/28/2024   Social Factors   Frequency of gathering with friends or relatives Once a week   Worry food won't last until get money to buy more No   Food not last or not have enough money for food? No   Do you have housing?  Yes   Are you worried about losing your housing? No   Lack of transportation? No   Unable to get utilities (heat,electricity)? No         2/28/2024   Dental   Dentist two times every year? Yes         2/28/2024   TB Screening   Were you born outside of [place]?  No       Today's PHQ-9 Score:       2/28/2024     8:58 AM   PHQ-9 SCORE   PHQ-9 Total Score MyChart 0   PHQ-9 Total Score 0         2/28/2024   Substance Use   Alcohol more than 3/day or more than 7/wk No   Do you use any other substances recreationally? No     Social History     Tobacco Use    Smoking status: Never     Passive exposure: Never    Smokeless tobacco: Never   Vaping Use    Vaping Use: Never used         2/28/2024   STI Screening   New sexual partner(s) since last STI/HIV test? No   ASCVD Risk   The 10-year ASCVD risk score (Froylan DK, et al., 2019) is: 2.5%    Values used to calculate the score:      Age: 43 years      Sex: Male      Is Non- : No      Diabetic: No      Tobacco smoker: No      Systolic Blood Pressure: 131 mmHg      Is BP treated: No      HDL Cholesterol: 36 mg/dL      Total Cholesterol: 208 mg/dL        2/28/2024   Contraception/Family Planning   Questions about contraception or family planning No       Reviewed and updated as needed this visit by Provider   Tobacco  Allergies  Meds  Problems  Med Hx  Surg Hx  Fam Hx                     Objective    Exam  /86 (BP Location: Left arm, Patient Position: Sitting, Cuff Size: Adult Large)   Pulse 80   Temp 98.5  F (36.9  C)  "(Oral)   Resp 16   Ht 1.791 m (5' 10.5\")   Wt 100.9 kg (222 lb 8 oz)   SpO2 99%   BMI 31.47 kg/m     Estimated body mass index is 31.47 kg/m  as calculated from the following:    Height as of this encounter: 1.791 m (5' 10.5\").    Weight as of this encounter: 100.9 kg (222 lb 8 oz).    Physical Exam  Constitutional:       Appearance: Normal appearance.   HENT:      Head: Normocephalic and atraumatic.      Right Ear: Tympanic membrane, ear canal and external ear normal.      Left Ear: Tympanic membrane, ear canal and external ear normal.      Nose: Nose normal.      Mouth/Throat:      Mouth: Mucous membranes are moist.      Pharynx: Oropharynx is clear.   Eyes:      Conjunctiva/sclera: Conjunctivae normal.      Pupils: Pupils are equal, round, and reactive to light.   Cardiovascular:      Rate and Rhythm: Normal rate and regular rhythm.      Heart sounds: Normal heart sounds.   Pulmonary:      Effort: Pulmonary effort is normal.      Breath sounds: Normal breath sounds.   Abdominal:      General: Bowel sounds are normal.      Palpations: Abdomen is soft.   Musculoskeletal:         General: Normal range of motion.      Cervical back: Normal range of motion and neck supple.   Skin:     General: Skin is warm and dry.      Capillary Refill: Capillary refill takes less than 2 seconds.   Neurological:      General: No focal deficit present.      Mental Status: He is alert.             Signed Electronically by: Valerie Stearns MD    Answers submitted by the patient for this visit:  Patient Health Questionnaire (Submitted on 2/28/2024)  If you checked off any problems, how difficult have these problems made it for you to do your work, take care of things at home, or get along with other people?: Not difficult at all  PHQ9 TOTAL SCORE: 0  ELGIN-7 (Submitted on 2/28/2024)  ELGIN 7 TOTAL SCORE: 1    "

## 2024-02-29 ENCOUNTER — LAB (OUTPATIENT)
Dept: LAB | Facility: CLINIC | Age: 44
End: 2024-02-29
Payer: COMMERCIAL

## 2024-02-29 DIAGNOSIS — E55.9 VITAMIN D DEFICIENCY: ICD-10-CM

## 2024-02-29 DIAGNOSIS — R74.01 ELEVATED ALT MEASUREMENT: ICD-10-CM

## 2024-02-29 DIAGNOSIS — Z13.0 SCREENING, ANEMIA, DEFICIENCY, IRON: ICD-10-CM

## 2024-02-29 DIAGNOSIS — E78.2 MIXED HYPERLIPIDEMIA: ICD-10-CM

## 2024-02-29 LAB
ERYTHROCYTE [DISTWIDTH] IN BLOOD BY AUTOMATED COUNT: 12.6 % (ref 10–15)
HCT VFR BLD AUTO: 42 % (ref 40–53)
HGB BLD-MCNC: 14.6 G/DL (ref 13.3–17.7)
MCH RBC QN AUTO: 28.5 PG (ref 26.5–33)
MCHC RBC AUTO-ENTMCNC: 34.8 G/DL (ref 31.5–36.5)
MCV RBC AUTO: 82 FL (ref 78–100)
PLATELET # BLD AUTO: 189 10E3/UL (ref 150–450)
RBC # BLD AUTO: 5.12 10E6/UL (ref 4.4–5.9)
WBC # BLD AUTO: 5.6 10E3/UL (ref 4–11)

## 2024-02-29 PROCEDURE — 85027 COMPLETE CBC AUTOMATED: CPT

## 2024-02-29 PROCEDURE — 80061 LIPID PANEL: CPT

## 2024-02-29 PROCEDURE — 82306 VITAMIN D 25 HYDROXY: CPT

## 2024-02-29 PROCEDURE — 80053 COMPREHEN METABOLIC PANEL: CPT

## 2024-02-29 PROCEDURE — 36415 COLL VENOUS BLD VENIPUNCTURE: CPT

## 2024-03-01 ENCOUNTER — TRANSFERRED RECORDS (OUTPATIENT)
Dept: HEALTH INFORMATION MANAGEMENT | Facility: CLINIC | Age: 44
End: 2024-03-01
Payer: COMMERCIAL

## 2024-03-01 LAB
ALBUMIN SERPL BCG-MCNC: 4.5 G/DL (ref 3.5–5.2)
ALP SERPL-CCNC: 104 U/L (ref 40–150)
ALT SERPL W P-5'-P-CCNC: 22 U/L (ref 0–70)
ANION GAP SERPL CALCULATED.3IONS-SCNC: 9 MMOL/L (ref 7–15)
AST SERPL W P-5'-P-CCNC: 18 U/L (ref 0–45)
BILIRUB SERPL-MCNC: 0.6 MG/DL
BUN SERPL-MCNC: 12.8 MG/DL (ref 6–20)
CALCIUM SERPL-MCNC: 9.2 MG/DL (ref 8.6–10)
CHLORIDE SERPL-SCNC: 101 MMOL/L (ref 98–107)
CHOLEST SERPL-MCNC: 201 MG/DL
CREAT SERPL-MCNC: 1.08 MG/DL (ref 0.67–1.17)
DEPRECATED HCO3 PLAS-SCNC: 28 MMOL/L (ref 22–29)
EGFRCR SERPLBLD CKD-EPI 2021: 88 ML/MIN/1.73M2
FASTING STATUS PATIENT QL REPORTED: YES
GLUCOSE SERPL-MCNC: 108 MG/DL (ref 70–99)
HDLC SERPL-MCNC: 35 MG/DL
LDLC SERPL CALC-MCNC: 137 MG/DL
NONHDLC SERPL-MCNC: 166 MG/DL
POTASSIUM SERPL-SCNC: 4.5 MMOL/L (ref 3.4–5.3)
PROT SERPL-MCNC: 7.5 G/DL (ref 6.4–8.3)
SODIUM SERPL-SCNC: 138 MMOL/L (ref 135–145)
TRIGL SERPL-MCNC: 147 MG/DL
VIT D+METAB SERPL-MCNC: 38 NG/ML (ref 20–50)

## 2024-05-07 DIAGNOSIS — E78.2 MIXED HYPERLIPIDEMIA: Primary | ICD-10-CM

## 2024-09-04 DIAGNOSIS — F41.9 ANXIETY: ICD-10-CM

## 2024-09-04 RX ORDER — CITALOPRAM HYDROBROMIDE 20 MG/1
20 TABLET ORAL DAILY
Qty: 30 TABLET | Refills: 0 | Status: SHIPPED | OUTPATIENT
Start: 2024-09-04 | End: 2024-09-11

## 2024-09-05 RX ORDER — CITALOPRAM HYDROBROMIDE 20 MG/1
20 TABLET ORAL DAILY
Qty: 90 TABLET | OUTPATIENT
Start: 2024-09-05

## 2024-09-09 DIAGNOSIS — F41.9 ANXIETY: ICD-10-CM

## 2024-09-11 RX ORDER — CITALOPRAM HYDROBROMIDE 20 MG/1
20 TABLET ORAL DAILY
Qty: 90 TABLET | Refills: 0 | Status: SHIPPED | OUTPATIENT
Start: 2024-09-11 | End: 2024-10-04

## 2024-10-04 ENCOUNTER — OFFICE VISIT (OUTPATIENT)
Dept: FAMILY MEDICINE | Facility: CLINIC | Age: 44
End: 2024-10-04
Payer: COMMERCIAL

## 2024-10-04 VITALS
WEIGHT: 224.9 LBS | HEIGHT: 71 IN | RESPIRATION RATE: 16 BRPM | HEART RATE: 61 BPM | DIASTOLIC BLOOD PRESSURE: 82 MMHG | SYSTOLIC BLOOD PRESSURE: 130 MMHG | OXYGEN SATURATION: 99 % | BODY MASS INDEX: 31.48 KG/M2 | TEMPERATURE: 98 F

## 2024-10-04 DIAGNOSIS — E55.9 VITAMIN D DEFICIENCY: ICD-10-CM

## 2024-10-04 DIAGNOSIS — E78.2 MIXED HYPERLIPIDEMIA: ICD-10-CM

## 2024-10-04 DIAGNOSIS — Z00.00 ROUTINE GENERAL MEDICAL EXAMINATION AT A HEALTH CARE FACILITY: ICD-10-CM

## 2024-10-04 DIAGNOSIS — E66.09 CLASS 1 OBESITY DUE TO EXCESS CALORIES WITHOUT SERIOUS COMORBIDITY WITH BODY MASS INDEX (BMI) OF 31.0 TO 31.9 IN ADULT: Primary | ICD-10-CM

## 2024-10-04 DIAGNOSIS — Z13.0 SCREENING, ANEMIA, DEFICIENCY, IRON: ICD-10-CM

## 2024-10-04 DIAGNOSIS — R74.01 ELEVATED ALT MEASUREMENT: ICD-10-CM

## 2024-10-04 DIAGNOSIS — M54.50 ACUTE BILATERAL LOW BACK PAIN WITHOUT SCIATICA: ICD-10-CM

## 2024-10-04 DIAGNOSIS — R73.9 HYPERGLYCEMIA: ICD-10-CM

## 2024-10-04 DIAGNOSIS — Z00.00 HEALTH CARE MAINTENANCE: ICD-10-CM

## 2024-10-04 DIAGNOSIS — E66.811 CLASS 1 OBESITY DUE TO EXCESS CALORIES WITHOUT SERIOUS COMORBIDITY WITH BODY MASS INDEX (BMI) OF 31.0 TO 31.9 IN ADULT: Primary | ICD-10-CM

## 2024-10-04 DIAGNOSIS — F41.9 ANXIETY: ICD-10-CM

## 2024-10-04 LAB
ERYTHROCYTE [DISTWIDTH] IN BLOOD BY AUTOMATED COUNT: 12.9 % (ref 10–15)
HCT VFR BLD AUTO: 41.2 % (ref 40–53)
HGB BLD-MCNC: 14.5 G/DL (ref 13.3–17.7)
MCH RBC QN AUTO: 28.9 PG (ref 26.5–33)
MCHC RBC AUTO-ENTMCNC: 35.2 G/DL (ref 31.5–36.5)
MCV RBC AUTO: 82 FL (ref 78–100)
PLATELET # BLD AUTO: 170 10E3/UL (ref 150–450)
RBC # BLD AUTO: 5.01 10E6/UL (ref 4.4–5.9)
WBC # BLD AUTO: 5.4 10E3/UL (ref 4–11)

## 2024-10-04 PROCEDURE — 80053 COMPREHEN METABOLIC PANEL: CPT | Performed by: FAMILY MEDICINE

## 2024-10-04 PROCEDURE — 36415 COLL VENOUS BLD VENIPUNCTURE: CPT | Performed by: FAMILY MEDICINE

## 2024-10-04 PROCEDURE — 82306 VITAMIN D 25 HYDROXY: CPT | Performed by: FAMILY MEDICINE

## 2024-10-04 PROCEDURE — 85027 COMPLETE CBC AUTOMATED: CPT | Performed by: FAMILY MEDICINE

## 2024-10-04 PROCEDURE — 99396 PREV VISIT EST AGE 40-64: CPT | Performed by: FAMILY MEDICINE

## 2024-10-04 PROCEDURE — 99214 OFFICE O/P EST MOD 30 MIN: CPT | Mod: 25 | Performed by: FAMILY MEDICINE

## 2024-10-04 PROCEDURE — 80061 LIPID PANEL: CPT | Performed by: FAMILY MEDICINE

## 2024-10-04 RX ORDER — CITALOPRAM HYDROBROMIDE 20 MG/1
20 TABLET ORAL DAILY
Qty: 90 TABLET | Refills: 1 | Status: SHIPPED | OUTPATIENT
Start: 2024-10-04

## 2024-10-04 RX ORDER — CLINDAMYCIN PHOSPHATE 11.9 MG/ML
SOLUTION TOPICAL
COMMUNITY
Start: 2024-09-05

## 2024-10-04 SDOH — HEALTH STABILITY: PHYSICAL HEALTH: ON AVERAGE, HOW MANY MINUTES DO YOU ENGAGE IN EXERCISE AT THIS LEVEL?: 30 MIN

## 2024-10-04 SDOH — HEALTH STABILITY: PHYSICAL HEALTH: ON AVERAGE, HOW MANY DAYS PER WEEK DO YOU ENGAGE IN MODERATE TO STRENUOUS EXERCISE (LIKE A BRISK WALK)?: 4 DAYS

## 2024-10-04 ASSESSMENT — SOCIAL DETERMINANTS OF HEALTH (SDOH): HOW OFTEN DO YOU GET TOGETHER WITH FRIENDS OR RELATIVES?: ONCE A WEEK

## 2024-10-04 NOTE — PROGRESS NOTES
Preventive Care Visit  [facility]  Valerie Stearns MD, Family Medicine  Oct 4, 2024    In addition to the preventive service, I spent 10 minutes discussing the patient's anxiety and celexa.       Problem List Items Addressed This Visit          Nervous and Auditory    RESOLVED: Acute bilateral low back pain without sciatica     Resolved. Not a current issue.             Digestive    Vitamin D deficiency     Vit d deficiency, recheck and titrate meds. .            Relevant Orders    Vitamin D Deficiency    Class 1 obesity due to excess calories without serious comorbidity with body mass index (BMI) of 31.0 to 31.9 in adult - Primary     Obesity evidenced by bmi 31.81 today. Discussed healthy lifestyle.             Endocrine    Mixed hyperlipidemia     Hyperlipidemia, weight reduction and healthy lifestyle discussed. Dash diet info put in chart.     There is a diet called the DASH DIET which is known to lower cholesterol, blood pressure and improve excess weight. https://www.nhlbi.nih.gov/education/dash-eating-plan          Relevant Orders    Lipid Profile       Other    Health care maintenance     Vaccines recommended: declined covid and flu shots.   Colonoscopy: - done 4/2023 polyps noted, repeat in 2028   Std testing desired: - offered  Osteoporosis prevention discussed.  vitamin d levels ordered. Recommend daily calcium and vitamin d intake to keep good bone health. Recommend weight bearing exercise, no tobacco, and limit alcohol  dexa  - no indication.   Recommend sunscreen, exercise, & healthy diet.  Offered cbc, cmp, lipids and asked what other testing he  desires today  I have had an Advance Directives discussion with the patient. His  has this.  Body mass index is 31.81 kg/m .   mychart active.          Anxiety     Anxiety, celexa 20 mg po q day working well.   Follow up in 6 months.             Relevant Medications    citalopram (CELEXA) 20 MG tablet    Elevated ALT  measurement     Ast/ alt normalized most recently, will continue to monitor. Weight reduction discussed.          Relevant Orders    Comprehensive metabolic panel (BMP + Alb, Alk Phos, ALT, AST, Total. Bili, TP)     Other Visit Diagnoses       Screening, anemia, deficiency, iron        Relevant Orders    CBC with platelets    Routine general medical examination at a health care facility                 Yamel Dioxn is a 44 year old, presenting for the following:  Physical (Discuss Celexa) and Imm/Inj (Will postpone COVID and Flu shot. )        10/4/2024     4:23 PM   Additional Questions   Roomed by Antoine Godinez MA   Accompanied by Self         10/4/2024     4:23 PM   Patient Reported Additional Medications   Patient reports taking the following new medications None        Health Care Directive  Patient does not have a Health Care Directive or Living Will: Discussed advance care planning with patient; however, patient declined at this time.          10/4/2024   General Health   How would you rate your overall physical health? Good   Feel stress (tense, anxious, or unable to sleep) Only a little      (!) STRESS CONCERN - discussed.       10/4/2024   Nutrition   Three or more servings of calcium each day? Yes   Diet: Regular (no restrictions)   How many servings of fruit and vegetables per day? (!) 0-1 - discussed. He thinks he gets 2-3 servings per day.   How many sweetened beverages each day? 0-1            10/4/2024   Exercise   Days per week of moderate/strenous exercise 4 days   Average minutes spent exercising at this level 30 min            10/4/2024   Social Factors   Frequency of gathering with friends or relatives Once a week   Worry food won't last until get money to buy more No   Food not last or not have enough money for food? No   Do you have housing? (Housing is defined as stable permanent housing and does not include staying ouside in a car, in a tent, in an abandoned building, in an overnight  "shelter, or couch-surfing.) Yes   Are you worried about losing your housing? No   Lack of transportation? No   Unable to get utilities (heat,electricity)? No            10/4/2024   Dental   Dentist two times every year? Yes            10/4/2024   TB Screening   Were you born outside of the [place]? No            10/4/2024   Substance Use   Alcohol more than 3/day or more than 7/wk No   Do you use any other substances recreationally? No        Social History     Tobacco Use    Smoking status: Never     Passive exposure: Never    Smokeless tobacco: Never   Vaping Use    Vaping status: Never Used         10/4/2024   STI Screening   New sexual partner(s) since last STI/HIV test? No      ASCVD Risk   The 10-year ASCVD risk score (Froylan MARAVILLA, et al., 2019) is: 2.7%    Values used to calculate the score:      Age: 44 years      Sex: Male      Is Non- : No      Diabetic: No      Tobacco smoker: No      Systolic Blood Pressure: 130 mmHg      Is BP treated: No      HDL Cholesterol: 35 mg/dL      Total Cholesterol: 201 mg/dL        10/4/2024   Contraception/Family Planning   Questions about contraception or family planning No      Reviewed and updated as needed this visit by Provider   Tobacco  Allergies  Meds  Problems  Med Hx  Surg Hx  Fam Hx               Objective    Exam  /82 (BP Location: Left arm, Patient Position: Sitting, Cuff Size: Adult Large)   Pulse 61   Temp 98  F (36.7  C) (Oral)   Resp 16   Ht 1.791 m (5' 10.5\")   Wt 102 kg (224 lb 14.4 oz)   SpO2 99%   BMI 31.81 kg/m     Estimated body mass index is 31.81 kg/m  as calculated from the following:    Height as of this encounter: 1.791 m (5' 10.5\").    Weight as of this encounter: 102 kg (224 lb 14.4 oz).    Physical Exam  Constitutional:       Appearance: Normal appearance.   HENT:      Head: Normocephalic and atraumatic.      Right Ear: Tympanic membrane, ear canal and external ear normal.      Left Ear: Tympanic " membrane, ear canal and external ear normal.      Nose: Nose normal.      Mouth/Throat:      Mouth: Mucous membranes are moist.      Pharynx: Oropharynx is clear.   Eyes:      Conjunctiva/sclera: Conjunctivae normal.      Pupils: Pupils are equal, round, and reactive to light.   Cardiovascular:      Rate and Rhythm: Normal rate and regular rhythm.      Heart sounds: Normal heart sounds.   Pulmonary:      Effort: Pulmonary effort is normal.      Breath sounds: Normal breath sounds.   Abdominal:      General: Bowel sounds are normal.      Palpations: Abdomen is soft.   Musculoskeletal:         General: Normal range of motion.      Cervical back: Normal range of motion and neck supple.   Skin:     General: Skin is warm and dry.      Capillary Refill: Capillary refill takes less than 2 seconds.   Neurological:      General: No focal deficit present.      Mental Status: He is alert.   Psychiatric:         Mood and Affect: Mood normal.         Behavior: Behavior normal.         Thought Content: Thought content normal.         Judgment: Judgment normal.               Signed Electronically by: Valerie Stearns MD

## 2024-10-04 NOTE — ASSESSMENT & PLAN NOTE
Hyperlipidemia, weight reduction and healthy lifestyle discussed. Dash diet info put in chart.     There is a diet called the DASH DIET which is known to lower cholesterol, blood pressure and improve excess weight. https://www.nhlbi.nih.gov/education/dash-eating-plan

## 2024-10-04 NOTE — ASSESSMENT & PLAN NOTE
Vaccines recommended: declined covid and flu shots.   Colonoscopy: - done 4/2023 polyps noted, repeat in 2028   Std testing desired: - offered  Osteoporosis prevention discussed.  vitamin d levels ordered. Recommend daily calcium and vitamin d intake to keep good bone health. Recommend weight bearing exercise, no tobacco, and limit alcohol  dexa  - no indication.   Recommend sunscreen, exercise, & healthy diet.  Offered cbc, cmp, lipids and asked what other testing he  desires today  I have had an Advance Directives discussion with the patient. His  has this.  Body mass index is 31.81 kg/m .   jose active.

## 2024-10-04 NOTE — PATIENT INSTRUCTIONS
Patient Education   Preventive Care Advice   This is general advice given by our system to help you stay healthy. However, your care team may have specific advice just for you. Please talk to your care team about your preventive care needs.  Nutrition  Eat 5 or more servings of fruits and vegetables each day.  Try wheat bread, brown rice and whole grain pasta (instead of white bread, rice, and pasta).  Get enough calcium and vitamin D. Check the label on foods and aim for 100% of the RDA (recommended daily allowance).  Lifestyle  Exercise at least 150 minutes each week  (30 minutes a day, 5 days a week).  Do muscle strengthening activities 2 days a week. These help control your weight and prevent disease.  No smoking.  Wear sunscreen to prevent skin cancer.  Have a dental exam and cleaning every 6 months.  Yearly exams  See your health care team every year to talk about:  Any changes in your health.  Any medicines your care team has prescribed.  Preventive care, family planning, and ways to prevent chronic diseases.  Shots (vaccines)   HPV shots (up to age 26), if you've never had them before.  Hepatitis B shots (up to age 59), if you've never had them before.  COVID-19 shot: Get this shot when it's due.  Flu shot: Get a flu shot every year.  Tetanus shot: Get a tetanus shot every 10 years.  Pneumococcal, hepatitis A, and RSV shots: Ask your care team if you need these based on your risk.  Shingles shot (for age 50 and up)  General health tests  Diabetes screening:  Starting at age 35, Get screened for diabetes at least every 3 years.  If you are younger than age 35, ask your care team if you should be screened for diabetes.  Cholesterol test: At age 39, start having a cholesterol test every 5 years, or more often if advised.  Bone density scan (DEXA): At age 50, ask your care team if you should have this scan for osteoporosis (brittle bones).  Hepatitis C: Get tested at least once in your life.  STIs (sexually  transmitted infections)  Before age 24: Ask your care team if you should be screened for STIs.  After age 24: Get screened for STIs if you're at risk. You are at risk for STIs (including HIV) if:  You are sexually active with more than one person.  You don't use condoms every time.  You or a partner was diagnosed with a sexually transmitted infection.  If you are at risk for HIV, ask about PrEP medicine to prevent HIV.  Get tested for HIV at least once in your life, whether you are at risk for HIV or not.  Cancer screening tests  Cervical cancer screening: If you have a cervix, begin getting regular cervical cancer screening tests starting at age 21.  Breast cancer scan (mammogram): If you've ever had breasts, begin having regular mammograms starting at age 40. This is a scan to check for breast cancer.  Colon cancer screening: It is important to start screening for colon cancer at age 45.  Have a colonoscopy test every 10 years (or more often if you're at risk) Or, ask your provider about stool tests like a FIT test every year or Cologuard test every 3 years.  To learn more about your testing options, visit:   .  For help making a decision, visit:   https://bit.ly/aq60887.  Prostate cancer screening test: If you have a prostate, ask your care team if a prostate cancer screening test (PSA) at age 55 is right for you.  Lung cancer screening: If you are a current or former smoker ages 50 to 80, ask your care team if ongoing lung cancer screenings are right for you.  For informational purposes only. Not to replace the advice of your health care provider. Copyright   2023 [employer]. All rights reserved. Clinically reviewed by the [facility] Transitions Program. [employer] [id number] - REV 01/24.

## 2024-10-05 LAB
ALBUMIN SERPL BCG-MCNC: 4.4 G/DL (ref 3.5–5.2)
ALP SERPL-CCNC: 104 U/L (ref 40–150)
ALT SERPL W P-5'-P-CCNC: 45 U/L (ref 0–70)
ANION GAP SERPL CALCULATED.3IONS-SCNC: 8 MMOL/L (ref 7–15)
AST SERPL W P-5'-P-CCNC: 24 U/L (ref 0–45)
BILIRUB SERPL-MCNC: 0.2 MG/DL
BUN SERPL-MCNC: 18 MG/DL (ref 6–20)
CALCIUM SERPL-MCNC: 9.1 MG/DL (ref 8.8–10.4)
CHLORIDE SERPL-SCNC: 103 MMOL/L (ref 98–107)
CHOLEST SERPL-MCNC: 217 MG/DL
CREAT SERPL-MCNC: 1.04 MG/DL (ref 0.67–1.17)
EGFRCR SERPLBLD CKD-EPI 2021: >90 ML/MIN/1.73M2
FASTING STATUS PATIENT QL REPORTED: NO
FASTING STATUS PATIENT QL REPORTED: NO
GLUCOSE SERPL-MCNC: 102 MG/DL (ref 70–99)
HCO3 SERPL-SCNC: 28 MMOL/L (ref 22–29)
HDLC SERPL-MCNC: 31 MG/DL
LDLC SERPL CALC-MCNC: 122 MG/DL
NONHDLC SERPL-MCNC: 186 MG/DL
POTASSIUM SERPL-SCNC: 4.1 MMOL/L (ref 3.4–5.3)
PROT SERPL-MCNC: 7.1 G/DL (ref 6.4–8.3)
SODIUM SERPL-SCNC: 139 MMOL/L (ref 135–145)
TRIGL SERPL-MCNC: 319 MG/DL
VIT D+METAB SERPL-MCNC: 39 NG/ML (ref 20–50)

## 2024-12-31 ENCOUNTER — TRANSFERRED RECORDS (OUTPATIENT)
Dept: HEALTH INFORMATION MANAGEMENT | Facility: CLINIC | Age: 44
End: 2024-12-31
Payer: COMMERCIAL

## 2025-01-29 ENCOUNTER — PATIENT OUTREACH (OUTPATIENT)
Dept: CARE COORDINATION | Facility: CLINIC | Age: 45
End: 2025-01-29
Payer: COMMERCIAL

## 2025-02-12 ENCOUNTER — PATIENT OUTREACH (OUTPATIENT)
Dept: CARE COORDINATION | Facility: CLINIC | Age: 45
End: 2025-02-12
Payer: COMMERCIAL

## 2025-02-14 ENCOUNTER — TRANSFERRED RECORDS (OUTPATIENT)
Dept: HEALTH INFORMATION MANAGEMENT | Facility: CLINIC | Age: 45
End: 2025-02-14
Payer: COMMERCIAL

## 2025-04-05 ENCOUNTER — HEALTH MAINTENANCE LETTER (OUTPATIENT)
Age: 45
End: 2025-04-05

## 2025-08-27 ENCOUNTER — PATIENT OUTREACH (OUTPATIENT)
Dept: CARE COORDINATION | Facility: CLINIC | Age: 45
End: 2025-08-27
Payer: COMMERCIAL